# Patient Record
Sex: FEMALE | Race: WHITE | NOT HISPANIC OR LATINO | Employment: PART TIME | URBAN - METROPOLITAN AREA
[De-identification: names, ages, dates, MRNs, and addresses within clinical notes are randomized per-mention and may not be internally consistent; named-entity substitution may affect disease eponyms.]

---

## 2024-07-23 ENCOUNTER — APPOINTMENT (OUTPATIENT)
Dept: GENERAL RADIOLOGY | Facility: CLINIC | Age: 79
DRG: 562 | End: 2024-07-23
Payer: MEDICARE

## 2024-07-23 ENCOUNTER — APPOINTMENT (OUTPATIENT)
Dept: CT IMAGING | Facility: CLINIC | Age: 79
DRG: 562 | End: 2024-07-23
Payer: MEDICARE

## 2024-07-23 ENCOUNTER — HOSPITAL ENCOUNTER (INPATIENT)
Facility: CLINIC | Age: 79
LOS: 4 days | Discharge: NURSING, REHAB OR LONG-TERM CARE | DRG: 562 | End: 2024-07-30
Attending: EMERGENCY MEDICINE | Admitting: STUDENT IN AN ORGANIZED HEALTH CARE EDUCATION/TRAINING PROGRAM
Payer: MEDICARE

## 2024-07-23 DIAGNOSIS — S82.202A LEFT TIBIAL FRACTURE: ICD-10-CM

## 2024-07-23 DIAGNOSIS — S82.402A TIBIA/FIBULA FRACTURE, LEFT, CLOSED, INITIAL ENCOUNTER: ICD-10-CM

## 2024-07-23 DIAGNOSIS — F41.9 ANXIETY: ICD-10-CM

## 2024-07-23 DIAGNOSIS — H04.123 DRY EYES: ICD-10-CM

## 2024-07-23 DIAGNOSIS — H40.50X3 GLAUCOMA SECONDARY TO OTHER EYE DISORDER, SEVERE STAGE, UNSPECIFIED LATERALITY: ICD-10-CM

## 2024-07-23 DIAGNOSIS — I82.409 ACUTE DEEP VEIN THROMBOSIS (DVT) OF OTHER VEIN OF LOWER EXTREMITY, UNSPECIFIED LATERALITY (H): Primary | ICD-10-CM

## 2024-07-23 DIAGNOSIS — I10 PRIMARY HYPERTENSION: ICD-10-CM

## 2024-07-23 DIAGNOSIS — S82.202A TIBIA/FIBULA FRACTURE, LEFT, CLOSED, INITIAL ENCOUNTER: ICD-10-CM

## 2024-07-23 DIAGNOSIS — L43.9 LICHEN PLANUS: ICD-10-CM

## 2024-07-23 LAB
ATRIAL RATE - MUSE: 104 BPM
DIASTOLIC BLOOD PRESSURE - MUSE: NORMAL MMHG
INTERPRETATION ECG - MUSE: NORMAL
P AXIS - MUSE: 64 DEGREES
PR INTERVAL - MUSE: 188 MS
QRS DURATION - MUSE: 68 MS
QT - MUSE: 342 MS
QTC - MUSE: 449 MS
R AXIS - MUSE: 90 DEGREES
SYSTOLIC BLOOD PRESSURE - MUSE: NORMAL MMHG
T AXIS - MUSE: 65 DEGREES
VENTRICULAR RATE- MUSE: 104 BPM

## 2024-07-23 PROCEDURE — G0378 HOSPITAL OBSERVATION PER HR: HCPCS

## 2024-07-23 PROCEDURE — 73552 X-RAY EXAM OF FEMUR 2/>: CPT | Mod: LT

## 2024-07-23 PROCEDURE — 96374 THER/PROPH/DIAG INJ IV PUSH: CPT

## 2024-07-23 PROCEDURE — 250N000011 HC RX IP 250 OP 636: Mod: JZ

## 2024-07-23 PROCEDURE — 99285 EMERGENCY DEPT VISIT HI MDM: CPT | Mod: 25

## 2024-07-23 PROCEDURE — 73590 X-RAY EXAM OF LOWER LEG: CPT | Mod: LT

## 2024-07-23 PROCEDURE — 93005 ELECTROCARDIOGRAM TRACING: CPT

## 2024-07-23 PROCEDURE — 72192 CT PELVIS W/O DYE: CPT

## 2024-07-23 PROCEDURE — 72170 X-RAY EXAM OF PELVIS: CPT

## 2024-07-23 PROCEDURE — 99222 1ST HOSP IP/OBS MODERATE 55: CPT | Performed by: HOSPITALIST

## 2024-07-23 PROCEDURE — 73700 CT LOWER EXTREMITY W/O DYE: CPT | Mod: LT,XS

## 2024-07-23 PROCEDURE — 73700 CT LOWER EXTREMITY W/O DYE: CPT | Mod: LT

## 2024-07-23 PROCEDURE — 73560 X-RAY EXAM OF KNEE 1 OR 2: CPT | Mod: LT

## 2024-07-23 PROCEDURE — 96375 TX/PRO/DX INJ NEW DRUG ADDON: CPT

## 2024-07-23 RX ORDER — CLOBETASOL PROPIONATE 0.5 MG/G
CREAM TOPICAL
Status: ON HOLD | COMMUNITY
End: 2024-07-30

## 2024-07-23 RX ORDER — TAFLUPROST OPTHALMIC 0 MG/.3ML
1 SOLUTION/ DROPS OPHTHALMIC AT BEDTIME
COMMUNITY

## 2024-07-23 RX ORDER — BRIMONIDINE TARTRATE 2 MG/ML
2 SOLUTION/ DROPS OPHTHALMIC 2 TIMES DAILY
COMMUNITY

## 2024-07-23 RX ORDER — ONDANSETRON 2 MG/ML
4 INJECTION INTRAMUSCULAR; INTRAVENOUS ONCE
Status: COMPLETED | OUTPATIENT
Start: 2024-07-23 | End: 2024-07-23

## 2024-07-23 RX ORDER — SUMATRIPTAN 25 MG/1
25 TABLET, FILM COATED ORAL
COMMUNITY

## 2024-07-23 RX ORDER — ACETAMINOPHEN 500 MG
1000 TABLET ORAL EVERY 6 HOURS PRN
COMMUNITY

## 2024-07-23 RX ORDER — MORPHINE SULFATE 4 MG/ML
4 INJECTION, SOLUTION INTRAMUSCULAR; INTRAVENOUS ONCE
Status: COMPLETED | OUTPATIENT
Start: 2024-07-23 | End: 2024-07-23

## 2024-07-23 RX ORDER — AMLODIPINE BESYLATE 5 MG/1
5 TABLET ORAL DAILY
Status: ON HOLD | COMMUNITY
End: 2024-07-29

## 2024-07-23 RX ORDER — CYCLOSPORINE 0.5 MG/ML
1 EMULSION OPHTHALMIC 2 TIMES DAILY
COMMUNITY

## 2024-07-23 RX ORDER — FENTANYL CITRATE 50 UG/ML
25 INJECTION, SOLUTION INTRAMUSCULAR; INTRAVENOUS ONCE
Status: COMPLETED | OUTPATIENT
Start: 2024-07-23 | End: 2024-07-23

## 2024-07-23 RX ORDER — IBANDRONATE SODIUM 150 MG/1
150 TABLET, FILM COATED ORAL
COMMUNITY

## 2024-07-23 RX ORDER — MULTIPLE VITAMINS W/ MINERALS TAB 9MG-400MCG
1 TAB ORAL DAILY
COMMUNITY

## 2024-07-23 RX ADMIN — ONDANSETRON 4 MG: 2 INJECTION INTRAMUSCULAR; INTRAVENOUS at 17:58

## 2024-07-23 RX ADMIN — PROCHLORPERAZINE EDISYLATE 5 MG: 5 INJECTION INTRAMUSCULAR; INTRAVENOUS at 19:42

## 2024-07-23 RX ADMIN — FENTANYL CITRATE 25 MCG: 50 INJECTION, SOLUTION INTRAMUSCULAR; INTRAVENOUS at 18:01

## 2024-07-23 RX ADMIN — MORPHINE SULFATE 4 MG: 4 INJECTION, SOLUTION INTRAMUSCULAR; INTRAVENOUS at 20:03

## 2024-07-23 ASSESSMENT — ACTIVITIES OF DAILY LIVING (ADL)
ADLS_ACUITY_SCORE: 35

## 2024-07-23 ASSESSMENT — COLUMBIA-SUICIDE SEVERITY RATING SCALE - C-SSRS
1. IN THE PAST MONTH, HAVE YOU WISHED YOU WERE DEAD OR WISHED YOU COULD GO TO SLEEP AND NOT WAKE UP?: NO
6. HAVE YOU EVER DONE ANYTHING, STARTED TO DO ANYTHING, OR PREPARED TO DO ANYTHING TO END YOUR LIFE?: NO
2. HAVE YOU ACTUALLY HAD ANY THOUGHTS OF KILLING YOURSELF IN THE PAST MONTH?: NO

## 2024-07-23 NOTE — ED TRIAGE NOTES
Patient presents to the ED via EMS with reports of visiting a friend and reports  falling backwards on patient. Patient reports left knee pain and left hip pain. Patient had IV placed by EMS and was given Fentanyl, Droperidol and Zofran. O2 sats dropped. Patient on 2L O2. Patients .     Triage Assessment (Adult)       Row Name 07/23/24 5944          Triage Assessment    Airway WDL WDL        Respiratory WDL    Respiratory WDL WDL        Skin Circulation/Temperature WDL    Skin Circulation/Temperature WDL WDL        Cardiac WDL    Cardiac WDL WDL        Peripheral/Neurovascular WDL    Peripheral Neurovascular WDL WDL        Cognitive/Neuro/Behavioral WDL    Cognitive/Neuro/Behavioral WDL WDL

## 2024-07-23 NOTE — ED PROVIDER NOTES
ED APC SUPERVISION NOTE:   I evaluated this patient in conjunction with Kelvin Corral NP  I have participated in the care of the patient and personally performed key elements of the history, exam, and medical decision making.      HPI:   Lacie Choi is a 78 year old female presenting with left hip and left knee pain after a fall.  She was walking behind her , when he fell backwards.  She tried to catch him and unfortunately her  fell on top of her.  She landed on her left lower extremity.  She endorses left hip and left knee pain.  She was unable to ambulate after the fall.  She did not strike her head or lose consciousness.  No neck pain.  No numbness or tingling in the lower extremity.  EMS administered fentanyl, droperidol and Zofran.          Independent Historian:   None    Review of External Notes: 7/15/24: Clinic note reviewed      EXAM:   General: No acute distress  Head: No obvious trauma to head.  Ears, Nose, Throat:  External ears normal.  Nose normal.  No pharyngeal erythema, swelling or exudate.  Midline uvula. Moist mucus membranes.  Eyes:  Conjunctivae clear.   Neck: Normal range of motion.  Neck supple.   CV: Regular rate and rhythm.  No murmurs.      Respiratory: Effort normal and breath sounds normal.  No wheezing or crackles.   Gastrointestinal: Soft.  No distension. There is no tenderness.  There is no rigidity, no rebound and no guarding.   Musculoskeletal: Left hip pain and left knee pain to palpation. No gross deformities.   Neuro: Alert. Moving all extremities appropriately.  Normal speech.    Skin: Skin is warm and dry.  No rash noted.   Psych: Normal mood and affect. Behavior is normal.       Independent Interpretation (X-rays, CTs, rhythm strip):  Knee x-ray shows cortical irregularity is at the tibial plateau    Consultations/Discussion of Management or Tests:  Patient discussed with Dr. Peña, hospitalist    Social Determinants of Health affecting care:    None     MEDICAL DECISION MAKING/ASSESSMENT AND PLAN:   Patient presents with left lower extremity pain after a fall.  She is neurovascularly intact.  She did not strike her head.  X-ray is concerning for tibial plateau fracture, but it is difficult to determine.  CT scan is obtained and confirms a nondisplaced tibial plateau fracture.  She requires fentanyl and morphine for pain control.  I discussed the CT findings with the patient and her .  They report that they live in a 4 story home.  I am concerned that she would be a fall risk if she were to be discharged tonight.  I think she would benefit from staying in the hospital for continued pain control and to have a formal PT/OT evaluation and orthopedic evaluation in the morning.  She is agreeable with this.  The patient is discussed with the hospitalist who agreed to admit the patient their service.  She remains in stable condition.       DIAGNOSIS:     ICD-10-CM    1. Left tibial fracture  S82.202A              DISPOSITION:   Patient admitted to the hospital     Scribe Disclosure:  I, Renae Gerard, am serving as a scribe at 5:43 PM on 7/23/2024 to document services personally performed by Luis Fernando Jeffrey MD based on my observations and the provider's statements to me.     7/23/2024  [facility] EMERGENCY DEPT     Luis Fernando Jeffrey MD  07/23/24 5944

## 2024-07-23 NOTE — ED NOTES
Bed: ED15  Expected date:   Expected time:   Means of arrival:   Comments:  [facility] 78 F fall knee pain

## 2024-07-23 NOTE — ED PROVIDER NOTES
Emergency Department Note      History of Present Illness     Chief Complaint   Fall (Patient presents to the ED via EMS with reports of visiting a friend and reports  falling backwards on patient. Patient reports left knee pain and left hip pain. Patient had IV placed by EMS and was given Fentanyl, Droperidol and Zofran. O2 sats dropped. Patient on 2L O2. Patients .)      ARON Choi is a 78 year old female who presents emergency department via EMS for the evaluation of a fall.  Patient reports that she was visiting a friend and was walking behind her .  She reports her  started to fall backwards and as she reached out to catch him subsequently fell herself.  She reports that she thinks that she landed on her left side primarily on her left knee.  Patient reports that she is primarily experiencing pain in her left knee, left hip, and left lower leg.  She denies any loss of consciousness.  She denies any cervical neck tenderness.     Independent Historian   None    Review of External Notes   None    Past Medical History     Medical History and Problem List   Past Medical History:   Diagnosis Date    Ankle instability, left     Anxiety     Cancer, uterine (H)     Diverticulosis of large intestine     Elevated coronary artery calcium score 2019    Glaucoma     HTN (hypertension)     Hyperlipidemia     Lichen sclerosus     Migraine     Nephrolithiasis 2011    Osteochondral defect of ankle     Osteoporosis     Raynaud's syndrome     Reactive gastropathy 2021    Squamous cell carcinoma, leg, right 2022    Tarsal coalition of left foot        Medications   No current outpatient medications on file.      Surgical History   Past Surgical History:   Procedure Laterality Date    APPENDECTOMY      ARTHROSCOPY ANKLE Left 08/30/2022    Left ankle arthroscopy, treatment of osteochondral injury talus, lateral ligament reconstruction, treatment of calcaneonavicular coaltion    DILATION AND  "CURETTAGE      HYSTERECTOMY TOTAL ABDOMINAL, BILATERAL SALPINGO-OOPHORECTOMY, COMBINED  1988    MOHS MICROGRAPHIC PROCEDURE  2005    TONSILLECTOMY      TUBAL LIGATION         Physical Exam     Patient Vitals for the past 24 hrs:   BP Temp Temp src Pulse Resp SpO2 Height Weight   07/23/24 2200 118/66 -- -- 106 -- 92 % -- --   07/23/24 2145 (!) 111/93 -- -- 105 -- 92 % -- --   07/23/24 2130 106/58 -- -- 97 -- 92 % -- --   07/23/24 2115 110/59 -- -- 101 -- 91 % -- --   07/23/24 2100 114/68 -- -- 106 -- 91 % -- --   07/23/24 2030 121/64 -- -- 105 -- 93 % -- --   07/23/24 2015 98/56 -- -- 104 -- 92 % -- --   07/23/24 2000 100/55 -- -- 107 -- 93 % -- --   07/23/24 1945 102/76 -- -- 117 -- 90 % -- --   07/23/24 1930 119/63 -- -- 109 -- 93 % -- --   07/23/24 1915 136/68 -- -- 113 -- 92 % -- --   07/23/24 1900 125/73 -- -- -- -- 91 % -- --   07/23/24 1724 121/71 97.5  F (36.4  C) Tympanic 95 19 94 % 1.499 m (4' 11\") 52.2 kg (115 lb)     Physical Exam  General: Alert and oriented.    Head: Normocephalic.  External ears and nose normal.    Eyes: Pupils equal and round.  Normal tracking.    Pulmonary/Chest: Effort and rate normal.     MSK:    No focal ttp over lateral process of tibial plateau.  Normal tracking of the patella with quadriceps and patella tendons intact on knee extension with straight leg raise.  No gross ligamentous laxity.  Normal movement at the hip and ankle.  Compartments soft, no pain w/passive ROM of ankle or great toe.    SKIN:  Warm and dry with strong DP,PT pulse and normal capillary refill. No rashes or crepitance.    NEURO:  Normal sensation throughout the foot and ankle. Normal strength of plantar and dorsiflexion in ankle and toes.    PSYCH:  Normal affect     Diagnostics     Lab Results   Labs Ordered and Resulted from Time of ED Arrival to Time of ED Departure - No data to display    Imaging   CT Pelvis Bone wo Contrast   Final Result   IMPRESSION:   1.  Acute-appearing, nondisplaced fracture of " the proximal, medial tibial metaphysis. This demonstrates equivocal intra-articular extension to the medial tibial plateau, although the lack of a significant joint effusion suggests against intra-articular    extension.   2.  No acute left femur or pelvic fracture is identified. Given the degree of osseous demineralization, MRI would be more sensitive for nondisplaced fracture and should be considered if there is persistent clinical concern.   3.  Additional chronic-appearing findings, as described.      CT Femur Thigh Left w/o Contrast   Final Result   IMPRESSION:   1.  Acute-appearing, nondisplaced fracture of the proximal, medial tibial metaphysis. This demonstrates equivocal intra-articular extension to the medial tibial plateau, although the lack of a significant joint effusion suggests against intra-articular    extension.   2.  No acute left femur or pelvic fracture is identified. Given the degree of osseous demineralization, MRI would be more sensitive for nondisplaced fracture and should be considered if there is persistent clinical concern.   3.  Additional chronic-appearing findings, as described.      CT Knee Left w/o Contrast   Final Result   IMPRESSION:   1.  Acute-appearing, nondisplaced fracture of the proximal, medial tibial metaphysis. This demonstrates equivocal intra-articular extension to the medial tibial plateau, although the lack of a significant joint effusion suggests against intra-articular    extension.   2.  No acute left femur or pelvic fracture is identified. Given the degree of osseous demineralization, MRI would be more sensitive for nondisplaced fracture and should be considered if there is persistent clinical concern.   3.  Additional chronic-appearing findings, as described.      XR Pelvis 1/2 Views   Final Result   IMPRESSION:       There is diffuse, severe osseous demineralization which limits radiographic evaluation for nondisplaced fractures. Additionally, the views of the  left hip and proximal femur are nonstandard and limited by patient rotation as there was difficulty    positioning the patient due to patient pain.      1.  There is cortical irregularity to the anterior medial tibial plateau which likely represents a nondisplaced, acute fracture. Knee CT could confirm.   2.  There is some sclerosis involving the left femoral neck and a nondisplaced femoral neck fracture is difficult to exclude. Additionally, the pelvis is poorly evaluated due to demineralization and patient rotation. Recommend CT of the left hip/pelvis    for full evaluation.      XR Femur Left 2 Views   Final Result   IMPRESSION:       There is diffuse, severe osseous demineralization which limits radiographic evaluation for nondisplaced fractures. Additionally, the views of the left hip and proximal femur are nonstandard and limited by patient rotation as there was difficulty    positioning the patient due to patient pain.      1.  There is cortical irregularity to the anterior medial tibial plateau which likely represents a nondisplaced, acute fracture. Knee CT could confirm.   2.  There is some sclerosis involving the left femoral neck and a nondisplaced femoral neck fracture is difficult to exclude. Additionally, the pelvis is poorly evaluated due to demineralization and patient rotation. Recommend CT of the left hip/pelvis    for full evaluation.      XR Knee Left 1/2 Views   Final Result   IMPRESSION:       There is diffuse, severe osseous demineralization which limits radiographic evaluation for nondisplaced fractures. Additionally, the views of the left hip and proximal femur are nonstandard and limited by patient rotation as there was difficulty    positioning the patient due to patient pain.      1.  There is cortical irregularity to the anterior medial tibial plateau which likely represents a nondisplaced, acute fracture. Knee CT could confirm.   2.  There is some sclerosis involving the left femoral  neck and a nondisplaced femoral neck fracture is difficult to exclude. Additionally, the pelvis is poorly evaluated due to demineralization and patient rotation. Recommend CT of the left hip/pelvis    for full evaluation.      XR Tibia and Fibula Left 2 Views   Final Result   IMPRESSION:       There is diffuse, severe osseous demineralization which limits radiographic evaluation for nondisplaced fractures. Additionally, the views of the left hip and proximal femur are nonstandard and limited by patient rotation as there was difficulty    positioning the patient due to patient pain.      1.  There is cortical irregularity to the anterior medial tibial plateau which likely represents a nondisplaced, acute fracture. Knee CT could confirm.   2.  There is some sclerosis involving the left femoral neck and a nondisplaced femoral neck fracture is difficult to exclude. Additionally, the pelvis is poorly evaluated due to demineralization and patient rotation. Recommend CT of the left hip/pelvis    for full evaluation.          EKG           Independent Interpretation   None    ED Course      Medications Administered   Medications   fentaNYL (PF) (SUBLIMAZE) injection 25 mcg (25 mcg Intravenous $Given 7/23/24 1801)   ondansetron (ZOFRAN) injection 4 mg (4 mg Intravenous $Given 7/23/24 1758)   morphine (PF) injection 4 mg (4 mg Intravenous $Given 7/23/24 2003)   prochlorperazine (COMPAZINE) injection 5 mg (5 mg Intravenous $Given 7/23/24 1942)       Procedures   Procedures     Discussion of Management   Admitting HospitalistMariana    ED Course        Optional/Additional Documentation  None    Medical Decision Making / Diagnosis     CMS Diagnoses: None    MIPS       None    MDM   Lacie Choi is a 78 year old female who presents to the emergency department via EMS for the evaluation of left knee pain.  Patient reports that she fell backwards while trying to catch her  who is also following.  She reports that  she landed on her left knee.  There is pain and tenderness noted during palpation of her patella extending distally down her left tibia.  X-rays were initially performed of her left knee and left hip however the patient was unable to lay flat.  CT scan was then performed which then did confirm a left tibial fracture.  Patient CMS to the left leg is intact.  Patient is having difficulty with mobility and is having poor pain control here in the emergency department.  I discussed these findings with the admitting hospitalist who agreed to admit the patient observation for further pain control and orthopedic consult.  Patient was agreeable to this plan.  All questions and concerns were addressed and patient verbalized understanding.  All radiological findings were discussed with the patient and patient verbalized understanding.  Patient was successfully admitted to the hospital for further evaluation and observation.    Disposition   The patient was admitted to the hospital.     Diagnosis     ICD-10-CM    1. Left tibial fracture  S82.202A            Discharge Medications   New Prescriptions    No medications on file         LIZ Jones CNP, Casey, APRN CNP  07/23/24 2228

## 2024-07-24 ENCOUNTER — DOCUMENTATION ONLY (OUTPATIENT)
Dept: ORTHOPEDICS | Facility: CLINIC | Age: 79
End: 2024-07-24
Payer: COMMERCIAL

## 2024-07-24 ENCOUNTER — APPOINTMENT (OUTPATIENT)
Dept: PHYSICAL THERAPY | Facility: CLINIC | Age: 79
DRG: 562 | End: 2024-07-24
Attending: PHYSICIAN ASSISTANT
Payer: MEDICARE

## 2024-07-24 LAB
ANION GAP SERPL CALCULATED.3IONS-SCNC: 9 MMOL/L (ref 7–15)
BASOPHILS # BLD AUTO: 0 10E3/UL (ref 0–0.2)
BASOPHILS NFR BLD AUTO: 0 %
BUN SERPL-MCNC: 17.8 MG/DL (ref 8–23)
CALCIUM SERPL-MCNC: 9.2 MG/DL (ref 8.8–10.4)
CHLORIDE SERPL-SCNC: 108 MMOL/L (ref 98–107)
CREAT SERPL-MCNC: 0.78 MG/DL (ref 0.51–0.95)
EGFRCR SERPLBLD CKD-EPI 2021: 78 ML/MIN/1.73M2
EOSINOPHIL # BLD AUTO: 0 10E3/UL (ref 0–0.7)
EOSINOPHIL NFR BLD AUTO: 0 %
ERYTHROCYTE [DISTWIDTH] IN BLOOD BY AUTOMATED COUNT: 13.7 % (ref 10–15)
GLUCOSE BLDC GLUCOMTR-MCNC: 153 MG/DL (ref 70–99)
GLUCOSE SERPL-MCNC: 130 MG/DL (ref 70–99)
HCO3 SERPL-SCNC: 26 MMOL/L (ref 22–29)
HCT VFR BLD AUTO: 45.9 % (ref 35–47)
HGB BLD-MCNC: 15 G/DL (ref 11.7–15.7)
IMM GRANULOCYTES # BLD: 0.1 10E3/UL
IMM GRANULOCYTES NFR BLD: 0 %
LYMPHOCYTES # BLD AUTO: 1.2 10E3/UL (ref 0.8–5.3)
LYMPHOCYTES NFR BLD AUTO: 10 %
MCH RBC QN AUTO: 31.8 PG (ref 26.5–33)
MCHC RBC AUTO-ENTMCNC: 32.7 G/DL (ref 31.5–36.5)
MCV RBC AUTO: 98 FL (ref 78–100)
MONOCYTES # BLD AUTO: 0.8 10E3/UL (ref 0–1.3)
MONOCYTES NFR BLD AUTO: 7 %
NEUTROPHILS # BLD AUTO: 10 10E3/UL (ref 1.6–8.3)
NEUTROPHILS NFR BLD AUTO: 83 %
NRBC # BLD AUTO: 0 10E3/UL
NRBC BLD AUTO-RTO: 0 /100
PLATELET # BLD AUTO: 156 10E3/UL (ref 150–450)
POTASSIUM SERPL-SCNC: 3.8 MMOL/L (ref 3.4–5.3)
RBC # BLD AUTO: 4.71 10E6/UL (ref 3.8–5.2)
SODIUM SERPL-SCNC: 143 MMOL/L (ref 135–145)
WBC # BLD AUTO: 12.1 10E3/UL (ref 4–11)

## 2024-07-24 PROCEDURE — 80048 BASIC METABOLIC PNL TOTAL CA: CPT | Performed by: HOSPITALIST

## 2024-07-24 PROCEDURE — L1832 KO ADJ JNT POS R SUP PRE CST: HCPCS

## 2024-07-24 PROCEDURE — G0378 HOSPITAL OBSERVATION PER HR: HCPCS

## 2024-07-24 PROCEDURE — 97530 THERAPEUTIC ACTIVITIES: CPT | Mod: GP

## 2024-07-24 PROCEDURE — 97161 PT EVAL LOW COMPLEX 20 MIN: CPT | Mod: GP

## 2024-07-24 PROCEDURE — 82962 GLUCOSE BLOOD TEST: CPT

## 2024-07-24 PROCEDURE — 250N000009 HC RX 250: Performed by: HOSPITALIST

## 2024-07-24 PROCEDURE — 99233 SBSQ HOSP IP/OBS HIGH 50: CPT | Performed by: PHYSICIAN ASSISTANT

## 2024-07-24 PROCEDURE — 250N000013 HC RX MED GY IP 250 OP 250 PS 637: Performed by: STUDENT IN AN ORGANIZED HEALTH CARE EDUCATION/TRAINING PROGRAM

## 2024-07-24 PROCEDURE — 36415 COLL VENOUS BLD VENIPUNCTURE: CPT | Performed by: HOSPITALIST

## 2024-07-24 PROCEDURE — 85025 COMPLETE CBC W/AUTO DIFF WBC: CPT | Performed by: HOSPITALIST

## 2024-07-24 PROCEDURE — 250N000013 HC RX MED GY IP 250 OP 250 PS 637: Performed by: HOSPITALIST

## 2024-07-24 RX ORDER — AMOXICILLIN 250 MG
2 CAPSULE ORAL 2 TIMES DAILY PRN
Status: DISCONTINUED | OUTPATIENT
Start: 2024-07-24 | End: 2024-07-30 | Stop reason: HOSPADM

## 2024-07-24 RX ORDER — NALOXONE HYDROCHLORIDE 0.4 MG/ML
0.4 INJECTION, SOLUTION INTRAMUSCULAR; INTRAVENOUS; SUBCUTANEOUS
Status: DISCONTINUED | OUTPATIENT
Start: 2024-07-24 | End: 2024-07-30

## 2024-07-24 RX ORDER — ACETAMINOPHEN 325 MG/1
975 TABLET ORAL 3 TIMES DAILY
Status: DISCONTINUED | OUTPATIENT
Start: 2024-07-24 | End: 2024-07-25

## 2024-07-24 RX ORDER — OXYCODONE HYDROCHLORIDE 5 MG/1
5 TABLET ORAL EVERY 4 HOURS PRN
Status: DISCONTINUED | OUTPATIENT
Start: 2024-07-24 | End: 2024-07-29

## 2024-07-24 RX ORDER — BRIMONIDINE TARTRATE 2 MG/ML
2 SOLUTION/ DROPS OPHTHALMIC 2 TIMES DAILY
Status: DISCONTINUED | OUTPATIENT
Start: 2024-07-24 | End: 2024-07-30 | Stop reason: HOSPADM

## 2024-07-24 RX ORDER — ONDANSETRON 4 MG/1
4 TABLET, ORALLY DISINTEGRATING ORAL EVERY 6 HOURS PRN
Status: DISCONTINUED | OUTPATIENT
Start: 2024-07-24 | End: 2024-07-30 | Stop reason: HOSPADM

## 2024-07-24 RX ORDER — CARBOXYMETHYLCELLULOSE SODIUM 5 MG/ML
1 SOLUTION/ DROPS OPHTHALMIC 2 TIMES DAILY
Status: DISCONTINUED | OUTPATIENT
Start: 2024-07-24 | End: 2024-07-30 | Stop reason: HOSPADM

## 2024-07-24 RX ORDER — ONDANSETRON 2 MG/ML
4 INJECTION INTRAMUSCULAR; INTRAVENOUS EVERY 6 HOURS PRN
Status: DISCONTINUED | OUTPATIENT
Start: 2024-07-24 | End: 2024-07-30 | Stop reason: HOSPADM

## 2024-07-24 RX ORDER — OXYCODONE HYDROCHLORIDE 5 MG/1
10 TABLET ORAL EVERY 4 HOURS PRN
Status: DISCONTINUED | OUTPATIENT
Start: 2024-07-24 | End: 2024-07-25

## 2024-07-24 RX ORDER — AMOXICILLIN 250 MG
1 CAPSULE ORAL 2 TIMES DAILY PRN
Status: DISCONTINUED | OUTPATIENT
Start: 2024-07-24 | End: 2024-07-30 | Stop reason: HOSPADM

## 2024-07-24 RX ORDER — TAFLUPROST OPTHALMIC 0 MG/.3ML
1 SOLUTION/ DROPS OPHTHALMIC AT BEDTIME
Status: DISCONTINUED | OUTPATIENT
Start: 2024-07-24 | End: 2024-07-24

## 2024-07-24 RX ORDER — HYDROMORPHONE HCL IN WATER/PF 6 MG/30 ML
0.4 PATIENT CONTROLLED ANALGESIA SYRINGE INTRAVENOUS
Status: DISCONTINUED | OUTPATIENT
Start: 2024-07-24 | End: 2024-07-29

## 2024-07-24 RX ORDER — LORAZEPAM 2 MG/ML
0.5 INJECTION INTRAMUSCULAR EVERY 4 HOURS PRN
Status: DISCONTINUED | OUTPATIENT
Start: 2024-07-24 | End: 2024-07-25

## 2024-07-24 RX ORDER — LANOLIN ALCOHOL/MO/W.PET/CERES
3 CREAM (GRAM) TOPICAL
Status: DISCONTINUED | OUTPATIENT
Start: 2024-07-24 | End: 2024-07-30 | Stop reason: HOSPADM

## 2024-07-24 RX ORDER — CYCLOSPORINE 0.5 MG/ML
1 EMULSION OPHTHALMIC 2 TIMES DAILY
Status: DISCONTINUED | OUTPATIENT
Start: 2024-07-24 | End: 2024-07-24

## 2024-07-24 RX ORDER — HYDROMORPHONE HCL IN WATER/PF 6 MG/30 ML
0.2 PATIENT CONTROLLED ANALGESIA SYRINGE INTRAVENOUS
Status: DISCONTINUED | OUTPATIENT
Start: 2024-07-24 | End: 2024-07-29

## 2024-07-24 RX ORDER — LORAZEPAM 1 MG/1
1 TABLET ORAL ONCE
Status: COMPLETED | OUTPATIENT
Start: 2024-07-24 | End: 2024-07-24

## 2024-07-24 RX ORDER — LATANOPROST 50 UG/ML
1 SOLUTION/ DROPS OPHTHALMIC AT BEDTIME
Status: DISCONTINUED | OUTPATIENT
Start: 2024-07-24 | End: 2024-07-30 | Stop reason: HOSPADM

## 2024-07-24 RX ORDER — NALOXONE HYDROCHLORIDE 0.4 MG/ML
0.2 INJECTION, SOLUTION INTRAMUSCULAR; INTRAVENOUS; SUBCUTANEOUS
Status: DISCONTINUED | OUTPATIENT
Start: 2024-07-24 | End: 2024-07-30

## 2024-07-24 RX ORDER — AMLODIPINE BESYLATE 5 MG/1
5 TABLET ORAL DAILY
Status: DISCONTINUED | OUTPATIENT
Start: 2024-07-24 | End: 2024-07-27

## 2024-07-24 RX ADMIN — ACETAMINOPHEN 975 MG: 325 TABLET, FILM COATED ORAL at 14:51

## 2024-07-24 RX ADMIN — OXYCODONE HYDROCHLORIDE 5 MG: 5 TABLET ORAL at 09:48

## 2024-07-24 RX ADMIN — BRIMONIDINE TARTRATE 2 DROP: 2 SOLUTION/ DROPS OPHTHALMIC at 21:55

## 2024-07-24 RX ADMIN — LATANOPROST 1 DROP: 50 SOLUTION OPHTHALMIC at 21:56

## 2024-07-24 RX ADMIN — ACETAMINOPHEN 975 MG: 325 TABLET, FILM COATED ORAL at 05:26

## 2024-07-24 RX ADMIN — CARBOXYMETHYLCELLULOSE SODIUM 1 DROP: 5 SOLUTION/ DROPS OPHTHALMIC at 21:55

## 2024-07-24 RX ADMIN — ACETAMINOPHEN 975 MG: 325 TABLET, FILM COATED ORAL at 19:46

## 2024-07-24 ASSESSMENT — ACTIVITIES OF DAILY LIVING (ADL)
ADLS_ACUITY_SCORE: 45
ADLS_ACUITY_SCORE: 49
ADLS_ACUITY_SCORE: 45
ADLS_ACUITY_SCORE: 45
ADLS_ACUITY_SCORE: 49
ADLS_ACUITY_SCORE: 49
ADLS_ACUITY_SCORE: 45
ADLS_ACUITY_SCORE: 49
ADLS_ACUITY_SCORE: 45
ADLS_ACUITY_SCORE: 45
ADLS_ACUITY_SCORE: 49
ADLS_ACUITY_SCORE: 35
ADLS_ACUITY_SCORE: 49
DEPENDENT_IADLS:: INDEPENDENT
ADLS_ACUITY_SCORE: 41
ADLS_ACUITY_SCORE: 49
ADLS_ACUITY_SCORE: 45
ADLS_ACUITY_SCORE: 35

## 2024-07-24 NOTE — PROGRESS NOTES
07/24/24 1600   Appointment Info   Signing Clinician's Name / Credentials (PT) Chelsea Gracia DPT   Rehab Comments (PT) NWB NAVJOTE, HK brace OSASHA   Quick Adds   Quick Adds Certification   Living Environment   People in Home spouse   Current Living Arrangements house   Home Accessibility stairs to enter home;stairs within home   Number of Stairs, Main Entrance 3   Stair Railings, Main Entrance railing on right side (ascending)   Number of Stairs, Within Home, Primary greater than 10 stairs  (4 level home, bathrooms are on second floor or down into basement)   Stair Railings, Within Home, Primary railings on both sides of stairs   Transportation Anticipated car, drives self;family or friend will provide   Living Environment Comments Lives with  in 4 level home with 3 JUAN, bathrooms are on second or basement level so pt cannot stay on one level. Has both walk in shower and tub.  can supervise but cannot help physically d/t chronic back issues.   Self-Care   Usual Activity Tolerance excellent   Current Activity Tolerance good   Regular Exercise No   Equipment Currently Used at Home walker, rolling;grab bar, toilet;grab bar, tub/shower;raised toilet seat;shower chair   Fall history within last six months yes   Number of times patient has fallen within last six months 1   Activity/Exercise/Self-Care Comment IND with ADLS/IADLS no AD prior to fall.   General Information   Onset of Illness/Injury or Date of Surgery 07/23/24   Referring Physician Radha Romero PA-C   Patient/Family Therapy Goals Statement (PT) Go home, open to TCU if in [place]   Pertinent History of Current Problem (include personal factors and/or comorbidities that impact the POC) Lacie Choi is a markedly pleasant 78 year old woman with past medical history that is most notable for osteoporosis, as well as hypertension, hyperlipidemia, and elevated coronary artery calcium score, among others; who presents with mechanical  fall and is found to have acute proximal tibial metaphysis fracture.   Existing Precautions/Restrictions fall;brace worn when out of bed;weight bearing   Weight-Bearing Status - LLE nonweight-bearing   Cognition   Affect/Mental Status (Cognition) WFL   Orientation Status (Cognition) oriented x 4   Follows Commands (Cognition) WFL   Pain Assessment   Patient Currently in Pain Yes, see Vital Sign flowsheet   Integumentary/Edema   Integumentary/Edema Comments Hinged knee brace present   Posture    Posture Forward head position   Range of Motion (ROM)   Range of Motion ROM deficits secondary to pain   Strength (Manual Muscle Testing)   Strength (Manual Muscle Testing) Deficits observed during functional mobility   Bed Mobility   Comment, (Bed Mobility) MinA2   Transfers   Comment, (Transfers) modA2 FWW NWB LLE   Gait/Stairs (Locomotion)   Comment, (Gait/Stairs) NT   Balance   Balance Comments Olvin seated balance   Clinical Impression   Criteria for Skilled Therapeutic Intervention Yes, treatment indicated   PT Diagnosis (PT) impaired gait   Influenced by the following impairments pain, NWB   Functional limitations due to impairments fall risk   Clinical Presentation (PT Evaluation Complexity) stable   Clinical Presentation Rationale clinical judgement   Clinical Decision Making (Complexity) low complexity   Planned Therapy Interventions (PT) balance training;bed mobility training;gait training;home exercise program;ROM (range of motion);stair training;strengthening;stretching;transfer training   Risk & Benefits of therapy have been explained evaluation/treatment results reviewed;care plan/treatment goals reviewed;risks/benefits reviewed;current/potential barriers reviewed;participants included;participants voiced agreement with care plan;patient   PT Total Evaluation Time   PT Eval, Low Complexity Minutes (26973) 14   Therapy Certification   Start of care date 07/23/24   Certification date from 07/24/24   Certification  date to 07/31/24   Physical Therapy Goals   PT Frequency 5x/week   PT Predicted Duration/Target Date for Goal Attainment 07/31/24   PT Goals Bed Mobility;Transfers;Gait;Stairs   PT: Bed Mobility Independent;Supine to/from sit;Rolling;Bridging   PT: Transfers Independent;Sit to/from stand;Bed to/from chair;Assistive device   PT: Gait Assistive device;100 feet;Within precautions   PT: Stairs Independent;Greater than 10 stairs;Rail on both sides   Interventions   Interventions Quick Adds Therapeutic Activity   Therapeutic Activity   Therapeutic Activities: dynamic activities to improve functional performance Minutes (18470) 26   Symptoms Noted During/After Treatment Dizziness  (Nausea)   Treatment Detail/Skilled Intervention Pt bedside, agreeable for PT. Reviewed NWB LLE recs, pt verbalized understanding. Time managing equipment in room and setting up. Mina2 bed mobility with cues for foot and hand palcement, assist managing LLE with hinged knee brace on. Olvin seated balance, BP WFL, pt reporting dizziness and nausea. Time at EOB waiting for sx to calm. Attempted sit<>Stnad modA2 with assist managing NWB LLE, standing for 10 sec before pt became too dizzy/nauseaous. TotalA to repo in bed. Alarm on. Edu on AP tfor circulation.   PT Discharge Planning   PT Plan Stand pivot as able, make sure recliner is in room. Consider SS if pt unable to hop.   PT Discharge Recommendation (DC Rec) Transitional Care Facility   PT Rationale for DC Rec Pt far below baseline IND no AD, currently unable to tolerate standing w/ modA2 FWW d/t dizziness and nausea. Pt lives in 4 level home with bathrooms on 2nd level,  is unable to provide any physical assist. Rec TCU to progress mobility as pt is unsafe to return home. Pt open to TCU if it can be in [place] where she lives.   PT Brief overview of current status RN to use lift to chair, consider SS if pt able to follow NWB LLE   PT Equipment Needed at Discharge walker, rolling   M  [facility]  OUTPATIENT PHYSICAL THERAPY EVALUATION  PLAN OF TREATMENT FOR OUTPATIENT REHABILITATION  (COMPLETE FOR INITIAL CLAIMS ONLY)  Patient's Last Name, First Name, M.I.  YOB: 1945  Lacie Choi                        Provider's Name  [facility] Medical Record No.  [id number]                             Onset Date:  07/23/24   Start of Care Date:  (P) 07/23/24   Type:     _X_PT   ___OT   ___SLP Medical Diagnosis:                 PT Diagnosis:  (P) impaired gait Visits from SOC:  1     See note for plan of treatment, functional goals and certification details    I CERTIFY THE NEED FOR THESE SERVICES FURNISHED UNDER        THIS PLAN OF TREATMENT AND WHILE UNDER MY CARE     (Physician co-signature of this document indicates review and certification of the therapy plan).

## 2024-07-24 NOTE — CONSULTS
[facility]    Orthopedic Consultation    Lacie Choi MRN# [id number]   Age: 78 year old YOB: 1945     Date of Admission:  7/23/2024    Reason for consult: Left proximal medial tibia non-displaced fracture        Requesting provider: KYLE Peña       Level of consult: One-time consult to assist in determining a diagnosis, recommend an appropriate treatment plan and place orders           Assessment and Plan:   Assessment:   Left proximal medial tibia non-displaced fracture       Plan:   Images were reviewed by Dr Lyle  Non-operative management recommended  Non-WB Left LE with hinged knee brace locked in extension.   Brace can be unlocked for ROMAT when at rest  Elevate left LE and ice prn   Follow-up with Dr Lyle at [facility] in 2 weeks for recheck.            Chief Complaint:   Left knee pain          History of Present Illness:   Lacie Choi is a pleasant 78 year old woman who presented to the ED with sudden onset of pain in her left leg since falling yesterday. She was at a friend's house with her .  She reports she was following her  on stairs when he slipped and fell onto her, causing her to fall onto her leg. She had immediate pain and difficulty ambulating.  EMS was called. CT revealed a non-displaced fracture.  She ambulates independently at baseline. CT as pelvis was negative for fracture.           Past Medical History:     Past Medical History:   Diagnosis Date    Ankle instability, left     Prior stress fracture    Anxiety     Cancer, uterine (H)     Status post DICK BSO remotely    Diverticulosis of large intestine     Elevated coronary artery calcium score 2019    Glaucoma     HTN (hypertension)     Hyperlipidemia     Lichen sclerosus     Migraine     Nephrolithiasis 2011    Osteochondral defect of ankle     Osteoporosis     Raynaud's syndrome     Reactive gastropathy 2021    On EGD    Squamous cell carcinoma, leg, right 2022    Tarsal  coalition of left foot              Past Surgical History:     Past Surgical History:   Procedure Laterality Date    APPENDECTOMY      ARTHROSCOPY ANKLE Left 08/30/2022    Left ankle arthroscopy, treatment of osteochondral injury talus, lateral ligament reconstruction, treatment of calcaneonavicular coaltion    DILATION AND CURETTAGE      HYSTERECTOMY TOTAL ABDOMINAL, BILATERAL SALPINGO-OOPHORECTOMY, COMBINED  1988    MOHS MICROGRAPHIC PROCEDURE  2005    TONSILLECTOMY      TUBAL LIGATION               Social History:     Social History     Tobacco Use    Smoking status: Former     Types: Cigarettes    Smokeless tobacco: Not on file   Substance Use Topics    Alcohol use: Yes             Family History:   No family history on file.          Immunizations:     VACCINE/DOSE   Diptheria   DPT   DTAP   HBIG   Hepatitis A   Hepatitis B   HIB   Influenza   Measles   Meningococcal   MMR   Mumps   Pneumococcal   Polio   Rubella   Small Pox   TDAP   Varicella   Zoster             Allergies:     Allergies   Allergen Reactions    Adhesive Tape Rash    Aspirin Other (See Comments)     hives    Cephalosporins Other (See Comments)     itchiness    Cyclobenzaprine Itching     Most muscle relaxers    Ibuprofen Other (See Comments)     itching    Iohexol Itching     PT WAS GIVEN OMNI 350 100 ML. HER TONGUE ITCHED AND AROUND HER LIPS.    Penicillins Other (See Comments)     Itching    Sulfa Antibiotics Other (See Comments)     itching    Covid-19 (Adenovirus) Vaccine Itching    Famotidine Itching    Gabapentin Nausea and Vomiting     irritability    Latex Itching             Medications:     Current Facility-Administered Medications   Medication Dose Route Frequency Provider Last Rate Last Admin    acetaminophen (TYLENOL) tablet 975 mg  975 mg Oral TID Quirino Peña MD   975 mg at 07/24/24 0526    brimonidine (ALPHAGAN) 0.2 % ophthalmic solution 2 drop  2 drop Right Eye BID Quirino Peña MD        cycloSPORINE  (RESTASIS) 0.05 % ophthalmic emulsion 1 drop  1 drop Both Eyes BID Quirino Peña MD        HYDROmorphone (DILAUDID) injection 0.2 mg  0.2 mg Intravenous Q2H PRQuirino Vernon MD        HYDROmorphone (DILAUDID) injection 0.4 mg  0.4 mg Intravenous Q2H PRN Quirino Peña MD        melatonin tablet 3 mg  3 mg Oral At Bedtime PRN Quirino Peña MD        naloxone (NARCAN) injection 0.2 mg  0.2 mg Intravenous Q2 Min PRQuirino Vernon MD        Or    naloxone (NARCAN) injection 0.4 mg  0.4 mg Intravenous Q2 Min PRQuirino Vernon MD        Or    naloxone (NARCAN) injection 0.2 mg  0.2 mg Intramuscular Q2 Min PRQuirino Vernon MD        Or    naloxone (NARCAN) injection 0.4 mg  0.4 mg Intramuscular Q2 Min PRQuirino Vernon MD        ondansetron (ZOFRAN ODT) ODT tab 4 mg  4 mg Oral Q6H PRN Quirino Peña MD        Or    ondansetron (ZOFRAN) injection 4 mg  4 mg Intravenous Q6H PRQuirino Vernon MD        oxyCODONE (ROXICODONE) tablet 10 mg  10 mg Oral Q4H PRQuirino Vernon MD        oxyCODONE (ROXICODONE) tablet 5 mg  5 mg Oral Q4H PRN Quirino Peña MD   5 mg at 07/24/24 0948    senna-docusate (SENOKOT-S/PERICOLACE) 8.6-50 MG per tablet 1 tablet  1 tablet Oral BID PRQuirino Vernon MD        Or    senna-docusate (SENOKOT-S/PERICOLACE) 8.6-50 MG per tablet 2 tablet  2 tablet Oral BID PRQuirino Vernon MD        tafluprost (ZIOPTAN) ophthalmic solution 1 drop  1 drop Both Eyes At Bedtime Quirino Peña MD                 Review of Systems:   ROS:  10 point ROS neg other than the symptoms noted above in the HPI.            Physical Exam:   All vitals have been reviewed  Patient Vitals for the past 24 hrs:   BP Temp Temp src Pulse Resp SpO2 Height Weight   07/24/24 0801 135/77 98  F (36.7  C) Oral 96 18 95 % -- --   07/24/24 0517 -- -- -- -- -- 96 % -- --   07/24/24 0442 -- -- -- -- -- 94 % -- --  "  07/24/24 0350 130/73 98.7  F (37.1  C) Oral 109 19 94 % -- --   07/24/24 0222 -- -- -- -- -- 94 % -- --   07/24/24 0205 (!) 155/78 99.2  F (37.3  C) Oral 119 19 92 % -- 55.7 kg (122 lb 12.7 oz)   07/24/24 0127 102/63 -- -- 99 -- 94 % -- --   07/24/24 0100 102/63 -- -- 101 -- 94 % -- --   07/24/24 0000 123/66 -- -- 94 -- 98 % -- --   07/23/24 2300 135/68 -- -- 102 -- 96 % -- --   07/23/24 2245 128/68 -- -- 104 -- 96 % -- --   07/23/24 2230 134/69 -- -- 105 -- 95 % -- --   07/23/24 2200 118/66 -- -- 106 -- 92 % -- --   07/23/24 2145 (!) 111/93 -- -- 105 -- 92 % -- --   07/23/24 2130 106/58 -- -- 97 -- 92 % -- --   07/23/24 2115 110/59 -- -- 101 -- 91 % -- --   07/23/24 2100 114/68 -- -- 106 -- 91 % -- --   07/23/24 2030 121/64 -- -- 105 -- 93 % -- --   07/23/24 2015 98/56 -- -- 104 -- 92 % -- --   07/23/24 2000 100/55 -- -- 107 -- 93 % -- --   07/23/24 1945 102/76 -- -- 117 -- 90 % -- --   07/23/24 1930 119/63 -- -- 109 -- 93 % -- --   07/23/24 1915 136/68 -- -- 113 -- 92 % -- --   07/23/24 1900 125/73 -- -- -- -- 91 % -- --   07/23/24 1724 121/71 97.5  F (36.4  C) Tympanic 95 19 94 % 1.499 m (4' 11\") 52.2 kg (115 lb)     No intake or output data in the 24 hours ending 07/24/24 1103      Physical Exam   Temp: 98  F (36.7  C) Temp src: Oral BP: 135/77 Pulse: 96   Resp: 18 SpO2: 95 % O2 Device: Nasal cannula Oxygen Delivery: 3 LPM  Vital Signs with Ranges  Temp:  [97.5  F (36.4  C)-99.2  F (37.3  C)] 98  F (36.7  C)  Pulse:  [] 96  Resp:  [18-19] 18  BP: ()/(55-93) 135/77  SpO2:  [90 %-98 %] 95 %  122 lbs 12.74 oz    Constitutional: Pleasant, alert, appropriate, following commands.  HEENT: Head atraumatic normocephalic. Pupils equal round and reactive to light.  Respiratory: Unlabored breathing no audible wheeze  Cardiovascular: Regular rate and rhythm per pulses  GI: Abdomen non-distended.  Lymph/Hematologic: No lymphadenopathy in areas examined  Genitourinary:  No taylor  Skin: No rashes, no cyanosis, no " edema.  Musculoskeletal: On physical exam of the left LE:  Skin: intact, healed anterior tibia scars from vascular surgery years prior.  No erythema.   Swelling: Mild proximal tibia swelling.  Patient does not have a knee effusion.   Alignment: neutral  Tenderness to palpation along the proximal medial tibia.  Denies lateral tenderness or pain along the distal femur.   ROM: able to range her knee 0-40 degrees with some discomfort.   Dorsi/Plantar flexion:  5/5  Calves:  Soft and non-tender  Distal pulses are intact and equal bilaterally  Sensation to light touch intact and equal bilaterally.     Neurologic: normal without focal findings, mental status, speech normal, alert and oriented x iii  Neuropsychiatric: stable            Data:   All laboratory data reviewed  Results for orders placed or performed during the hospital encounter of 07/23/24   XR Tibia and Fibula Left 2 Views     Status: None    Narrative    EXAM: XR TIBIA AND FIBULA LEFT 2 VIEWS, XR PELVIS 1/2 VIEWS, XR FEMUR LEFT 2 VIEWS, XR KNEE LEFT 1/2 VIEWS  LOCATION: [facility]  DATE: 7/23/2024    INDICATION: Fall trauma  COMPARISON: None.      Impression    IMPRESSION:     There is diffuse, severe osseous demineralization which limits radiographic evaluation for nondisplaced fractures. Additionally, the views of the left hip and proximal femur are nonstandard and limited by patient rotation as there was difficulty   positioning the patient due to patient pain.    1.  There is cortical irregularity to the anterior medial tibial plateau which likely represents a nondisplaced, acute fracture. Knee CT could confirm.  2.  There is some sclerosis involving the left femoral neck and a nondisplaced femoral neck fracture is difficult to exclude. Additionally, the pelvis is poorly evaluated due to demineralization and patient rotation. Recommend CT of the left hip/pelvis   for full evaluation.   XR Femur Left 2 Views     Status: None     Narrative    EXAM: XR TIBIA AND FIBULA LEFT 2 VIEWS, XR PELVIS 1/2 VIEWS, XR FEMUR LEFT 2 VIEWS, XR KNEE LEFT 1/2 VIEWS  LOCATION: [facility]  DATE: 7/23/2024    INDICATION: Fall trauma  COMPARISON: None.      Impression    IMPRESSION:     There is diffuse, severe osseous demineralization which limits radiographic evaluation for nondisplaced fractures. Additionally, the views of the left hip and proximal femur are nonstandard and limited by patient rotation as there was difficulty   positioning the patient due to patient pain.    1.  There is cortical irregularity to the anterior medial tibial plateau which likely represents a nondisplaced, acute fracture. Knee CT could confirm.  2.  There is some sclerosis involving the left femoral neck and a nondisplaced femoral neck fracture is difficult to exclude. Additionally, the pelvis is poorly evaluated due to demineralization and patient rotation. Recommend CT of the left hip/pelvis   for full evaluation.   XR Pelvis 1/2 Views     Status: None    Narrative    EXAM: XR TIBIA AND FIBULA LEFT 2 VIEWS, XR PELVIS 1/2 VIEWS, XR FEMUR LEFT 2 VIEWS, XR KNEE LEFT 1/2 VIEWS  LOCATION: [facility]  DATE: 7/23/2024    INDICATION: Fall trauma  COMPARISON: None.      Impression    IMPRESSION:     There is diffuse, severe osseous demineralization which limits radiographic evaluation for nondisplaced fractures. Additionally, the views of the left hip and proximal femur are nonstandard and limited by patient rotation as there was difficulty   positioning the patient due to patient pain.    1.  There is cortical irregularity to the anterior medial tibial plateau which likely represents a nondisplaced, acute fracture. Knee CT could confirm.  2.  There is some sclerosis involving the left femoral neck and a nondisplaced femoral neck fracture is difficult to exclude. Additionally, the pelvis is poorly evaluated due to demineralization and  patient rotation. Recommend CT of the left hip/pelvis   for full evaluation.   XR Knee Left 1/2 Views     Status: None    Narrative    EXAM: XR TIBIA AND FIBULA LEFT 2 VIEWS, XR PELVIS 1/2 VIEWS, XR FEMUR LEFT 2 VIEWS, XR KNEE LEFT 1/2 VIEWS  LOCATION: [facility]  DATE: 7/23/2024    INDICATION: Fall trauma  COMPARISON: None.      Impression    IMPRESSION:     There is diffuse, severe osseous demineralization which limits radiographic evaluation for nondisplaced fractures. Additionally, the views of the left hip and proximal femur are nonstandard and limited by patient rotation as there was difficulty   positioning the patient due to patient pain.    1.  There is cortical irregularity to the anterior medial tibial plateau which likely represents a nondisplaced, acute fracture. Knee CT could confirm.  2.  There is some sclerosis involving the left femoral neck and a nondisplaced femoral neck fracture is difficult to exclude. Additionally, the pelvis is poorly evaluated due to demineralization and patient rotation. Recommend CT of the left hip/pelvis   for full evaluation.   CT Pelvis Bone wo Contrast     Status: None    Narrative    EXAM: CT KNEE LEFT W/O CONTRAST, CT FEMUR THIGH LEFT W/O CONTRAST, CT PELVIS BONE WO CONTRAST  LOCATION: [facility]  DATE: 7/23/2024    INDICATION: Fall. Trauma. Rule out fracture.  COMPARISON: None.  TECHNIQUE: Noncontrast. Axial, sagittal and coronal thin-section reconstruction. Dose reduction techniques were used.     FINDINGS:     BONES:  -There is diffuse, severe osseous demineralization.     There is an acute-appearing, nondisplaced fracture of the proximal, medial tibial metaphysis. This demonstrates equivocal intra-articular extension to the medial tibial plateau, although the lack of a significant joint effusion suggests against   intra-articular extension.    No acute left femur or pelvic fracture is identified. Mild bilateral  hip degenerative arthrosis. There are also degenerative changes at the pubic symphysis.    SOFT TISSUES:  -Arterial calcifications are noted. No drainable soft tissue collection.      Impression    IMPRESSION:  1.  Acute-appearing, nondisplaced fracture of the proximal, medial tibial metaphysis. This demonstrates equivocal intra-articular extension to the medial tibial plateau, although the lack of a significant joint effusion suggests against intra-articular   extension.  2.  No acute left femur or pelvic fracture is identified. Given the degree of osseous demineralization, MRI would be more sensitive for nondisplaced fracture and should be considered if there is persistent clinical concern.  3.  Additional chronic-appearing findings, as described.   CT Femur Thigh Left w/o Contrast     Status: None    Narrative    EXAM: CT KNEE LEFT W/O CONTRAST, CT FEMUR THIGH LEFT W/O CONTRAST, CT PELVIS BONE WO CONTRAST  LOCATION: [facility]  DATE: 7/23/2024    INDICATION: Fall. Trauma. Rule out fracture.  COMPARISON: None.  TECHNIQUE: Noncontrast. Axial, sagittal and coronal thin-section reconstruction. Dose reduction techniques were used.     FINDINGS:     BONES:  -There is diffuse, severe osseous demineralization.     There is an acute-appearing, nondisplaced fracture of the proximal, medial tibial metaphysis. This demonstrates equivocal intra-articular extension to the medial tibial plateau, although the lack of a significant joint effusion suggests against   intra-articular extension.    No acute left femur or pelvic fracture is identified. Mild bilateral hip degenerative arthrosis. There are also degenerative changes at the pubic symphysis.    SOFT TISSUES:  -Arterial calcifications are noted. No drainable soft tissue collection.      Impression    IMPRESSION:  1.  Acute-appearing, nondisplaced fracture of the proximal, medial tibial metaphysis. This demonstrates equivocal intra-articular  extension to the medial tibial plateau, although the lack of a significant joint effusion suggests against intra-articular   extension.  2.  No acute left femur or pelvic fracture is identified. Given the degree of osseous demineralization, MRI would be more sensitive for nondisplaced fracture and should be considered if there is persistent clinical concern.  3.  Additional chronic-appearing findings, as described.   CT Knee Left w/o Contrast     Status: None    Narrative    EXAM: CT KNEE LEFT W/O CONTRAST, CT FEMUR THIGH LEFT W/O CONTRAST, CT PELVIS BONE WO CONTRAST  LOCATION: [facility]  DATE: 7/23/2024    INDICATION: Fall. Trauma. Rule out fracture.  COMPARISON: None.  TECHNIQUE: Noncontrast. Axial, sagittal and coronal thin-section reconstruction. Dose reduction techniques were used.     FINDINGS:     BONES:  -There is diffuse, severe osseous demineralization.     There is an acute-appearing, nondisplaced fracture of the proximal, medial tibial metaphysis. This demonstrates equivocal intra-articular extension to the medial tibial plateau, although the lack of a significant joint effusion suggests against   intra-articular extension.    No acute left femur or pelvic fracture is identified. Mild bilateral hip degenerative arthrosis. There are also degenerative changes at the pubic symphysis.    SOFT TISSUES:  -Arterial calcifications are noted. No drainable soft tissue collection.      Impression    IMPRESSION:  1.  Acute-appearing, nondisplaced fracture of the proximal, medial tibial metaphysis. This demonstrates equivocal intra-articular extension to the medial tibial plateau, although the lack of a significant joint effusion suggests against intra-articular   extension.  2.  No acute left femur or pelvic fracture is identified. Given the degree of osseous demineralization, MRI would be more sensitive for nondisplaced fracture and should be considered if there is persistent clinical  concern.  3.  Additional chronic-appearing findings, as described.   Basic metabolic panel     Status: Abnormal   Result Value Ref Range    Sodium 143 135 - 145 mmol/L    Potassium 3.8 3.4 - 5.3 mmol/L    Chloride 108 (H) 98 - 107 mmol/L    Carbon Dioxide (CO2) 26 22 - 29 mmol/L    Anion Gap 9 7 - 15 mmol/L    Urea Nitrogen 17.8 8.0 - 23.0 mg/dL    Creatinine 0.78 0.51 - 0.95 mg/dL    GFR Estimate 78 >60 mL/min/1.73m2    Calcium 9.2 8.8 - 10.4 mg/dL    Glucose 130 (H) 70 - 99 mg/dL   Glucose by meter     Status: Abnormal   Result Value Ref Range    GLUCOSE BY METER POCT 153 (H) 70 - 99 mg/dL   CBC with platelets and differential     Status: Abnormal   Result Value Ref Range    WBC Count 12.1 (H) 4.0 - 11.0 10e3/uL    RBC Count 4.71 3.80 - 5.20 10e6/uL    Hemoglobin 15.0 11.7 - 15.7 g/dL    Hematocrit 45.9 35.0 - 47.0 %    MCV 98 78 - 100 fL    MCH 31.8 26.5 - 33.0 pg    MCHC 32.7 31.5 - 36.5 g/dL    RDW 13.7 10.0 - 15.0 %    Platelet Count 156 150 - 450 10e3/uL    % Neutrophils 83 %    % Lymphocytes 10 %    % Monocytes 7 %    % Eosinophils 0 %    % Basophils 0 %    % Immature Granulocytes 0 %    NRBCs per 100 WBC 0 <1 /100    Absolute Neutrophils 10.0 (H) 1.6 - 8.3 10e3/uL    Absolute Lymphocytes 1.2 0.8 - 5.3 10e3/uL    Absolute Monocytes 0.8 0.0 - 1.3 10e3/uL    Absolute Eosinophils 0.0 0.0 - 0.7 10e3/uL    Absolute Basophils 0.0 0.0 - 0.2 10e3/uL    Absolute Immature Granulocytes 0.1 <=0.4 10e3/uL    Absolute NRBCs 0.0 10e3/uL   EKG 12-lead, tracing only     Status: None   Result Value Ref Range    Systolic Blood Pressure  mmHg    Diastolic Blood Pressure  mmHg    Ventricular Rate 104 BPM    Atrial Rate 104 BPM    AR Interval 188 ms    QRS Duration 68 ms     ms    QTc 449 ms    P Axis 64 degrees    R AXIS 90 degrees    T Axis 65 degrees    Interpretation ECG       Sinus tachycardia  Right atrial enlargement  Rightward axis  Borderline ECG  No previous ECGs available  Confirmed by GENERATED REPORT, COMPUTER  ([id number]),  Iban Colindres ([id number]) on 7/23/2024 10:50:13 PM     CBC with platelets differential     Status: Abnormal    Narrative    The following orders were created for panel order CBC with platelets differential.  Procedure                               Abnormality         Status                     ---------                               -----------         ------                     CBC with platelets and d...[[id number]]  Abnormal            Final result                 Please view results for these tests on the individual orders.          Attestation:  I have reviewed today's vital signs, notes, medications, labs and imaging with Dr. Lyle.  Amount of time performed on this consult: 50 minutes.    Linda Willson PA-C

## 2024-07-24 NOTE — PROGRESS NOTES
RECEIVING UNIT ED HANDOFF REVIEW    ED Nurse Handoff Report was reviewed by: Thea Gimenez RN on July 24, 2024 at 1:23 AM

## 2024-07-24 NOTE — PROGRESS NOTES
diagnostic tests and consults completed and resulted : on going  -vital signs normal or at patient baseline : yes  -tolerating oral intake to maintain hydration : No. On regular diet however intermittent nausea and vomiting persist.     -adequate pain control on oral analgesics : yes  -returns to baseline functional status : No. Leg brace in place on LLE. NWB on LLE.   -safe disposition plan has been identified : NO.   Nurse to notify provider when observation goals have been met and patient is ready for discharge.

## 2024-07-24 NOTE — PROGRESS NOTES
S: Lacie was seen today at the [facility] room 5513 for a locking a post-op style hinged knee brace to prevent knee hyperextension and flexion. She has a left closed fracture of proximal tibia.    O: The patient was fit with a Post-Op TSCOPE Premier on the right leg. Initial settings of the ROM are locked to full extension.    A: The thigh section was set at the minimum height setting and the below knee section was extended to just above the ankle. Having the brace end just above the ankle can help with suspension of the brace. The uprights were bent to create a more intimate fit for the patient. The straps were trimmed accordingly. Also, wearing a tall sock will increase patient comfort and reduce shear forces on the patient s skin.    P: The patient will wear the brace when weight bearing.    G: The goal is to maintain joint alignment, provide protective joint stability, prevent hyperextension and increase the patient's ability to safely perform ADLs. Please contact the [facility] Office at [phone] if you have any questions or concerns. Thank you, Lake    Electronically signed by Lake Wiggins, Board Eligible

## 2024-07-24 NOTE — PROGRESS NOTES
[facility]    Medicine Progress Note - Hospitalist Service    Date of Admission:  7/23/2024    Assessment & Plan    Lacie Choi is a markedly pleasant 78 year old woman with past medical history that is most notable for osteoporosis, as well as hypertension, hyperlipidemia, and elevated coronary artery calcium score, among others; who presents with mechanical fall and is found to have acute proximal tibial metaphysis fracture.     Acute proximal tibial metaphysis fracture: Of note, Ms. Choi has osteoporosis and is prescribed Boniva. In 2022 she underwent left ankle arthroscopy, with treatment of osteochondral injury talus, lateral ligament reconstruction, and treatment of calcaneonavicular coalition. She also reports surgical repair of her left shoulder rotator cuff last year.   Now, she presents after a mechanical fall tonight. In the ED, she has tachycardia, without hypotension or fever. She has brief hypoxia after administration of narcotics that has resolved now. X-rays of left knee, femur, tibia and fibula and pelvis are limited; CT of pelvis, left knee and left femur reveal an acute, nondisplaced fracture of the proximal, medial tibial metaphysis. This demonstrates equivocal intra-articular extension to the medial tibial plateau, although the lack of a significant joint effusion suggests against intra-articular extension to the Radiologist. No acute left femur or pelvic fracture is identified.   --Observation for pain relief.   --Fall precautions.   --Orthopedics consulted, plan for non operative management with NWB LLE with hinged knee brace locked in extension, can unlock for ROMAT when at rest. Follow up in 2 weeks for recheck at [facility]   --NWB LLE.    -- Tylenol for pain. Patient tried oxycodone and had significant nausea. She did not want to try another opioid.   --PT  --SW consulted for disposition planning.     Possible CAD: She has a remote history of positive  stress test, and later in 2019 she had a Coronary CT that showed an overall score of 912, reportedly with most of the scoring found in the LAD territory and the aorta. Reportedly she has a history of allergy to aspirin which causes hives. Noted     Hypertension: Resume home Norvasc 7/25, holding today due to dizziness      Glaucoma: Resume home eye drops           Observation Goals: -diagnostic tests and consults completed and resulted, -vital signs normal or at patient baseline, -tolerating oral intake to maintain hydration, -adequate pain control on oral analgesics, -returns to baseline functional status, -safe disposition plan has been identified, Nurse to notify provider when observation goals have been met and patient is ready for discharge.  Diet: NPO for Medical/Clinical Reasons Except for: Meds, Ice Chips    DVT Prophylaxis: Pneumatic Compression Devices, start lovenox tomorrow if still here  Maxwell Catheter: Not present  Lines: None     Cardiac Monitoring: None  Code Status: Full Code      Clinically Significant Risk Factors Present on Admission                                         Disposition Plan     Medically Ready for Discharge: 1-2 days pending improved nausea, pain control, PT eval and safe dispo            The patient's care was discussed with Dr. Coleman who agrees with the above plan     Radha Romero PA-C  Hospitalist Service  [facility]  Securely message with [employer] (more info)  Text page via [employer] Paging/Directory   ______________________________________________________________________    Interval History   Patient very nauseated at time of my visit. Tried oxycodone this morning and since taking has felt dizzy and very nauseated. Says she wants to throw up but just can't. Pain 4/10. 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     Physical Exam   Temp: 98  F (36.7  C) Temp src: Oral BP: 135/77 Pulse:  96   Resp: 18 SpO2: 95 % O2 Device: Nasal cannula Oxygen Delivery: 3 LPM  Vitals:    07/23/24 1724 07/24/24 0205   Weight: 52.2 kg (115 lb) 55.7 kg (122 lb 12.7 oz)     Vital Signs with Ranges  Temp:  [97.5  F (36.4  C)-99.2  F (37.3  C)] 98  F (36.7  C)  Pulse:  [] 96  Resp:  [18-19] 18  BP: ()/(55-93) 135/77  SpO2:  [90 %-98 %] 95 %  No intake/output data recorded.    Constitutional: Alert and oriented, laying down in bed. Appears uncomfortable clutching emesis bag. Keeps eyes closed.   ENT: moist mucous membranes  Respiratory: Lungs clear to auscultation bilaterally, no increased work of breathing  Cardiovascular: Regular rhythm with mild tachycardia   GI: active bowel sounds, abdomen soft, non-tender  MSK:  LLE with ice pack in place. Can wiggle toes  Neuro:  speech is clear. Face symmetric. Sensation intact       Medical Decision Making       60 MINUTES SPENT BY ME on the date of service doing chart review, history, exam, documentation & further activities per the note.      Data     I have personally reviewed the following data over the past 24 hrs:    12.1 (H)  \   15.0   / 156     143 108 (H) 17.8 /  130 (H)   3.8 26 0.78 \       Imaging results reviewed over the past 24 hrs:   Recent Results (from the past 24 hour(s))   XR Tibia and Fibula Left 2 Views    Narrative    EXAM: XR TIBIA AND FIBULA LEFT 2 VIEWS, XR PELVIS 1/2 VIEWS, XR FEMUR LEFT 2 VIEWS, XR KNEE LEFT 1/2 VIEWS  LOCATION: [facility]  DATE: 7/23/2024    INDICATION: Fall trauma  COMPARISON: None.      Impression    IMPRESSION:     There is diffuse, severe osseous demineralization which limits radiographic evaluation for nondisplaced fractures. Additionally, the views of the left hip and proximal femur are nonstandard and limited by patient rotation as there was difficulty   positioning the patient due to patient pain.    1.  There is cortical irregularity to the anterior medial tibial plateau which likely  represents a nondisplaced, acute fracture. Knee CT could confirm.  2.  There is some sclerosis involving the left femoral neck and a nondisplaced femoral neck fracture is difficult to exclude. Additionally, the pelvis is poorly evaluated due to demineralization and patient rotation. Recommend CT of the left hip/pelvis   for full evaluation.   XR Knee Left 1/2 Views    Narrative    EXAM: XR TIBIA AND FIBULA LEFT 2 VIEWS, XR PELVIS 1/2 VIEWS, XR FEMUR LEFT 2 VIEWS, XR KNEE LEFT 1/2 VIEWS  LOCATION: [facility]  DATE: 7/23/2024    INDICATION: Fall trauma  COMPARISON: None.      Impression    IMPRESSION:     There is diffuse, severe osseous demineralization which limits radiographic evaluation for nondisplaced fractures. Additionally, the views of the left hip and proximal femur are nonstandard and limited by patient rotation as there was difficulty   positioning the patient due to patient pain.    1.  There is cortical irregularity to the anterior medial tibial plateau which likely represents a nondisplaced, acute fracture. Knee CT could confirm.  2.  There is some sclerosis involving the left femoral neck and a nondisplaced femoral neck fracture is difficult to exclude. Additionally, the pelvis is poorly evaluated due to demineralization and patient rotation. Recommend CT of the left hip/pelvis   for full evaluation.   XR Femur Left 2 Views    Narrative    EXAM: XR TIBIA AND FIBULA LEFT 2 VIEWS, XR PELVIS 1/2 VIEWS, XR FEMUR LEFT 2 VIEWS, XR KNEE LEFT 1/2 VIEWS  LOCATION: [facility]  DATE: 7/23/2024    INDICATION: Fall trauma  COMPARISON: None.      Impression    IMPRESSION:     There is diffuse, severe osseous demineralization which limits radiographic evaluation for nondisplaced fractures. Additionally, the views of the left hip and proximal femur are nonstandard and limited by patient rotation as there was difficulty   positioning the patient due to patient  pain.    1.  There is cortical irregularity to the anterior medial tibial plateau which likely represents a nondisplaced, acute fracture. Knee CT could confirm.  2.  There is some sclerosis involving the left femoral neck and a nondisplaced femoral neck fracture is difficult to exclude. Additionally, the pelvis is poorly evaluated due to demineralization and patient rotation. Recommend CT of the left hip/pelvis   for full evaluation.   XR Pelvis 1/2 Views    Narrative    EXAM: XR TIBIA AND FIBULA LEFT 2 VIEWS, XR PELVIS 1/2 VIEWS, XR FEMUR LEFT 2 VIEWS, XR KNEE LEFT 1/2 VIEWS  LOCATION: [facility]  DATE: 7/23/2024    INDICATION: Fall trauma  COMPARISON: None.      Impression    IMPRESSION:     There is diffuse, severe osseous demineralization which limits radiographic evaluation for nondisplaced fractures. Additionally, the views of the left hip and proximal femur are nonstandard and limited by patient rotation as there was difficulty   positioning the patient due to patient pain.    1.  There is cortical irregularity to the anterior medial tibial plateau which likely represents a nondisplaced, acute fracture. Knee CT could confirm.  2.  There is some sclerosis involving the left femoral neck and a nondisplaced femoral neck fracture is difficult to exclude. Additionally, the pelvis is poorly evaluated due to demineralization and patient rotation. Recommend CT of the left hip/pelvis   for full evaluation.   CT Knee Left w/o Contrast    Narrative    EXAM: CT KNEE LEFT W/O CONTRAST, CT FEMUR THIGH LEFT W/O CONTRAST, CT PELVIS BONE WO CONTRAST  LOCATION: [facility]  DATE: 7/23/2024    INDICATION: Fall. Trauma. Rule out fracture.  COMPARISON: None.  TECHNIQUE: Noncontrast. Axial, sagittal and coronal thin-section reconstruction. Dose reduction techniques were used.     FINDINGS:     BONES:  -There is diffuse, severe osseous demineralization.     There is an  acute-appearing, nondisplaced fracture of the proximal, medial tibial metaphysis. This demonstrates equivocal intra-articular extension to the medial tibial plateau, although the lack of a significant joint effusion suggests against   intra-articular extension.    No acute left femur or pelvic fracture is identified. Mild bilateral hip degenerative arthrosis. There are also degenerative changes at the pubic symphysis.    SOFT TISSUES:  -Arterial calcifications are noted. No drainable soft tissue collection.      Impression    IMPRESSION:  1.  Acute-appearing, nondisplaced fracture of the proximal, medial tibial metaphysis. This demonstrates equivocal intra-articular extension to the medial tibial plateau, although the lack of a significant joint effusion suggests against intra-articular   extension.  2.  No acute left femur or pelvic fracture is identified. Given the degree of osseous demineralization, MRI would be more sensitive for nondisplaced fracture and should be considered if there is persistent clinical concern.  3.  Additional chronic-appearing findings, as described.   CT Femur Thigh Left w/o Contrast    Narrative    EXAM: CT KNEE LEFT W/O CONTRAST, CT FEMUR THIGH LEFT W/O CONTRAST, CT PELVIS BONE WO CONTRAST  LOCATION: [facility]  DATE: 7/23/2024    INDICATION: Fall. Trauma. Rule out fracture.  COMPARISON: None.  TECHNIQUE: Noncontrast. Axial, sagittal and coronal thin-section reconstruction. Dose reduction techniques were used.     FINDINGS:     BONES:  -There is diffuse, severe osseous demineralization.     There is an acute-appearing, nondisplaced fracture of the proximal, medial tibial metaphysis. This demonstrates equivocal intra-articular extension to the medial tibial plateau, although the lack of a significant joint effusion suggests against   intra-articular extension.    No acute left femur or pelvic fracture is identified. Mild bilateral hip degenerative arthrosis.  There are also degenerative changes at the pubic symphysis.    SOFT TISSUES:  -Arterial calcifications are noted. No drainable soft tissue collection.      Impression    IMPRESSION:  1.  Acute-appearing, nondisplaced fracture of the proximal, medial tibial metaphysis. This demonstrates equivocal intra-articular extension to the medial tibial plateau, although the lack of a significant joint effusion suggests against intra-articular   extension.  2.  No acute left femur or pelvic fracture is identified. Given the degree of osseous demineralization, MRI would be more sensitive for nondisplaced fracture and should be considered if there is persistent clinical concern.  3.  Additional chronic-appearing findings, as described.   CT Pelvis Bone wo Contrast    Narrative    EXAM: CT KNEE LEFT W/O CONTRAST, CT FEMUR THIGH LEFT W/O CONTRAST, CT PELVIS BONE WO CONTRAST  LOCATION: [facility]  DATE: 7/23/2024    INDICATION: Fall. Trauma. Rule out fracture.  COMPARISON: None.  TECHNIQUE: Noncontrast. Axial, sagittal and coronal thin-section reconstruction. Dose reduction techniques were used.     FINDINGS:     BONES:  -There is diffuse, severe osseous demineralization.     There is an acute-appearing, nondisplaced fracture of the proximal, medial tibial metaphysis. This demonstrates equivocal intra-articular extension to the medial tibial plateau, although the lack of a significant joint effusion suggests against   intra-articular extension.    No acute left femur or pelvic fracture is identified. Mild bilateral hip degenerative arthrosis. There are also degenerative changes at the pubic symphysis.    SOFT TISSUES:  -Arterial calcifications are noted. No drainable soft tissue collection.      Impression    IMPRESSION:  1.  Acute-appearing, nondisplaced fracture of the proximal, medial tibial metaphysis. This demonstrates equivocal intra-articular extension to the medial tibial plateau, although the lack  of a significant joint effusion suggests against intra-articular   extension.  2.  No acute left femur or pelvic fracture is identified. Given the degree of osseous demineralization, MRI would be more sensitive for nondisplaced fracture and should be considered if there is persistent clinical concern.  3.  Additional chronic-appearing findings, as described.

## 2024-07-24 NOTE — PROGRESS NOTES
[facility]  ED Nurse Handoff Report    ED Chief complaint: Fall (Patient presents to the ED via EMS with reports of visiting a friend and reports  falling backwards on patient. Patient reports left knee pain and left hip pain. Patient had IV placed by EMS and was given Fentanyl, Droperidol and Zofran. O2 sats dropped. Patient on 2L O2. Patients .)      ED Diagnosis:   Final diagnoses:   Left tibial fracture       Code Status: Full Code    Allergies:   Allergies   Allergen Reactions    Adhesive Tape Rash    Aspirin Other (See Comments)     hives    Cephalosporins Other (See Comments)     itchiness    Cyclobenzaprine Itching     Most muscle relaxers    Ibuprofen Other (See Comments)     itching    Iohexol Itching     PT WAS GIVEN OMNI 350 100 ML. HER TONGUE ITCHED AND AROUND HER LIPS.    Penicillins Other (See Comments)     Itching    Sulfa Antibiotics Other (See Comments)     itching    Covid-19 (Adenovirus) Vaccine Itching    Famotidine Itching    Gabapentin Nausea and Vomiting     irritability    Latex Itching       Patient Story Patient presents with left hip and left knee pain after a fall.  She was walking behind her , when he fell backwards.  She tried to catch him and unfortunately her  fell on top of her.  She landed on her left lower extremity.  She endorses left hip and left knee pain.  She was unable to ambulate after the fall.  She did not strike her head or lose consciousness.  No neck pain.  No numbness or tingling in the lower extremity.  EMS administered fentanyl, droperidol and Zofran.   Focused Assessment:  pain control and radiology    Treatments and/or interventions provided: meds, radiology  Patient's response to treatments and/or interventions: patient less nauseated and more comfortable    To be done/followed up on inpatient unit:  surgery consult. NPO after 12MN    Does this patient have any cognitive concerns?:  none    Activity level -  Baseline/Home:  Independent  Activity Level - Current:    bedrest    Patient's Preferred language: English   Needed?: No    Isolation: None  Infection: Not Applicable  Patient tested for COVID 19 prior to admission: NO  Bariatric?: No    Vital Signs:   Vitals:    07/23/24 2145 07/23/24 2200 07/23/24 2230 07/23/24 2245   BP: (!) 111/93 118/66 134/69 128/68   Pulse: 105 106 105 104   Resp:       Temp:       TempSrc:       SpO2: 92% 92% 95% 96%   Weight:       Height:           Cardiac Rhythm:     Was the PSS-3 completed:   Yes  What interventions are required if any?               Family Comments: supportive   OBS brochure/video discussed/provided to patient/family: N/A              Name of person given brochure if not patient:               Relationship to patient:     For the majority of the shift this patient's behavior was Green.   Behavioral interventions performed were none.    ED NURSE PHONE NUMBER: [phone]

## 2024-07-24 NOTE — CONSULTS
Care Management Initial Consult    General Information  Assessment completed with: PatientLacie  Type of CM/SW Visit: Initial Assessment    Primary Care Provider verified and updated as needed: Yes , Dr Nuñez at [facility] in [place]  Readmission within the last 30 days: no previous admission in last 30 days      Reason for Consult: discharge planning  Advance Care Planning: Advance Care Planning Reviewed: concerns discussed          Communication Assessment  Patient's communication style: spoken language (English or Bilingual)    Hearing Difficulty or Deaf: yes   Wear Glasses or Blind: yes    Cognitive  Cognitive/Neuro/Behavioral: WDL                      Living Environment:   People in home: spouse     Current living Arrangements: house      Able to return to prior arrangements: other (see comments)       Family/Social Support:  Care provided by: self  Provides care for: no one  Marital Status:              Description of Support System:           Current Resources:   Patient receiving home care services: No     Community Resources: Other (see comment) (outpatient PT)  Equipment currently used at home:    Supplies currently used at home:      Employment/Financial:  Employment Status: retired        Financial Concerns: none           Does the patient's insurance plan have a 3 day qualifying hospital stay waiver?  Yes     Which insurance plan 3 day waiver is available? Alternative insurance waiver    Will the waiver be used for post-acute placement? Undetermined at this time    Lifestyle & Psychosocial Needs:  Social Determinants of Health     Food Insecurity: No Food Insecurity (6/25/2024)    Received from [facility]    Food Insecurity     Worried About Running Out of Food in the Last Year: 1   Depression: Not at risk (3/11/2024)    Received from [facility]    PHQ-2     PHQ-2 TOTAL SCORE: 1   Housing Stability: Low Risk  (6/25/2024)  "   Received from [facility]    Housing Stability     Unable to Pay for Housing in the Last Year: 1   Tobacco Use: Medium Risk (7/23/2024)    Patient History     Smoking Tobacco Use: Former     Smokeless Tobacco Use: Unknown     Passive Exposure: Not on file   Financial Resource Strain: Low Risk  (6/25/2024)    Received from [facility]    Financial Resource Strain     Difficulty of Paying Living Expenses: 3     Difficulty of Paying Living Expenses: Not on file   Alcohol Use: Not on file   Transportation Needs: No Transportation Needs (6/25/2024)    Received from [facility]    Transportation Needs     Lack of Transportation (Medical): 1   Physical Activity: Not on file   Interpersonal Safety: Not on file   Stress: Not on file   Social Connections: Socially Integrated (6/25/2024)    Received from [facility]    Social Connections     Frequency of Communication with Friends and Family: 0   Health Literacy: Not on file       Functional Status:  Prior to admission patient needed assistance:   Dependent ADLs:: Independent  Dependent IADLs:: Independent       Mental Health Status:  Mental Health Status: No Current Concerns       Chemical Dependency Status:      unknown          Values/Beliefs:  Spiritual, Cultural Beliefs, Roman [employer] Practices, Values that affect care: no               Additional Information:  -writer met with patient at bedside. Consult placed for discharge planning. Writer introduced self and role. Patient complains of nausea. \"Its' the opoid I got, I am so sensitive to medications.\"  -patient tells writer she is also\"Out of it\" but wanted to answer questions writer had.  Patient lives in a house in [place], [place] . She attends outpatient PT.She tells writer \"no surgery for now\". She will have to work with PT once her nausea is controlled.  Will follow for discharge " planning.      Erica Hooks RN

## 2024-07-24 NOTE — PROVIDER NOTIFICATION
"MD Notification    Notified Person: MD    Notified Person Name: Dr. Peña    Notification Date/Time: 7/24 @ 0359    Notification Interaction: Vocera message     Purpose of Notification: Hi, pt is kind of anxious and shakey. Denies being \"cold but can't stop shaking, body feels frozen\". Declined interventions for ongoing nausea and pain. She's a bit tachy in 110's, 94% on 3L NC, all other VSS. Are we able to give her something low dose for anxiety?     Orders Received:    Comments:   "

## 2024-07-24 NOTE — H&P
[facility]    History and Physical  Hospitalist       Date of Admission:  7/23/2024  Date of Service (when I saw the patient): 07/23/24    ASSESSMENT  Lacie Choi is a markedly pleasant 78 year old woman with past medical history that is most notable for osteoporosis, as well as hypertension, hyperlipidemia, and elevated coronary artery calcium score, among others; who presents with mechanical fall and is found to have acute proximal tibial metaphysis fracture.    PLAN     Acute proximal tibial metaphysis fracture: Of note, Ms. Choi has osteoporosis and is prescribed Boniva. In 2022 she underwent left ankle arthroscopy, with treatment of osteochondral injury talus, lateral ligament reconstruction, and treatment of calcaneonavicular coalition. She also reports surgical repair of her left shoulder rotator cuff last year.     Now, she presents after a mechanical fall tonight. In the ED, she has tachycardia, without hypotension or fever. She has brief hypoxia after administration of narcotics that has resolved now. X-rays of left knee, femur, tibia and fibula and pelvis are limited; CT of pelvis, left knee and left femur reveal an acute, nondisplaced fracture of the proximal, medial tibial metaphysis. This demonstrates equivocal intra-articular extension to the medial tibial plateau, although the lack of a significant joint effusion suggests against intra-articular extension to the Radiologist. No acute left femur or pelvic fracture is identified.        -- Observation for pain relief. Fall precautions. NPO after midnight. Orthopedics consulted. NWB LLE.    -- Tylenol, Oxycodone, IV Dilaudid as needed for pain. Anti-emetics as needed. She reports having had allergies to Neurontin and most muscle relaxants in the past.    -- CBC and BMP ordered. SW consulted for disposition planning.    Aurea-operative Risk Assessment: RCRI 1 with Functional Status greater than 4 METS going for  intermediate risk procedure for CV events. EKG shows sinus tachycardia. Recommend to proceed to urgent surgery without further cardiac intervention or testing.    Possible CAD: She has a remote history of positive stress test, and later in 2019 she had a Coronary CT that showed an overall score of 912, reportedly with most of the scoring found in the LAD territory and the aorta. Reportedly she has a history of allergy to aspirin which causes hives. Noted    Hypertension: Resume home Norvasc when verified    Glaucoma: Resume home eye drops when verified.    I have spent 55 minutes on the date of service doing chart review, history, examination, documentation, and further activities per the note.    Chief Complaint   Left leg pain    History is obtained from the patient and the ED physician whom I have spoken with    History of Present Illness   Lacie Choi is a markedly pleasant 78 year old woman who presents with sudden onset of pain in her left leg that has been constant and severe since she fell this evening. She was at a friend's house with her  celebrating a reunion, and was following her  up some stairs when he slipped and fell onto her, causing her to fall onto her leg. Any movement of the leg exacerbates the pain. she received Fenatanyl, Droperidol, and Zofran in the field, and IV Fentanyl, Morphine, Zofran, and Compazine given in the ED have partially improved her symptoms. 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 She has developed a number of serious reactions to multiple medications in the past, and we discussed her concerns for possibly developing such reactions to pain medications while she is here. She adds that normally she is able to walk up a flight of stirs without getting dyspnea or chest pain. She other wise denies any other acute complaints.    In the ED,   07/23 1724 121/71  97.5  F (36.4  C) 95 19 94 %     EKG showed sinus tachycardia    Recent Results (from the past 24 hour(s))   XR Tibia and Fibula Left 2 Views    Narrative    EXAM: XR TIBIA AND FIBULA LEFT 2 VIEWS, XR PELVIS 1/2 VIEWS, XR FEMUR LEFT 2 VIEWS, XR KNEE LEFT 1/2 VIEWS  LOCATION: [facility]  DATE: 7/23/2024    INDICATION: Fall trauma  COMPARISON: None.      Impression    IMPRESSION:     There is diffuse, severe osseous demineralization which limits radiographic evaluation for nondisplaced fractures. Additionally, the views of the left hip and proximal femur are nonstandard and limited by patient rotation as there was difficulty   positioning the patient due to patient pain.    1.  There is cortical irregularity to the anterior medial tibial plateau which likely represents a nondisplaced, acute fracture. Knee CT could confirm.  2.  There is some sclerosis involving the left femoral neck and a nondisplaced femoral neck fracture is difficult to exclude. Additionally, the pelvis is poorly evaluated due to demineralization and patient rotation. Recommend CT of the left hip/pelvis   for full evaluation.   XR Knee Left 1/2 Views    Narrative    EXAM: XR TIBIA AND FIBULA LEFT 2 VIEWS, XR PELVIS 1/2 VIEWS, XR FEMUR LEFT 2 VIEWS, XR KNEE LEFT 1/2 VIEWS  LOCATION: [facility]  DATE: 7/23/2024    INDICATION: Fall trauma  COMPARISON: None.      Impression    IMPRESSION:     There is diffuse, severe osseous demineralization which limits radiographic evaluation for nondisplaced fractures. Additionally, the views of the left hip and proximal femur are nonstandard and limited by patient rotation as there was difficulty   positioning the patient due to patient pain.    1.  There is cortical irregularity to the anterior medial tibial plateau which likely represents a nondisplaced, acute fracture. Knee CT could confirm.  2.  There is some sclerosis involving the left femoral neck and a  nondisplaced femoral neck fracture is difficult to exclude. Additionally, the pelvis is poorly evaluated due to demineralization and patient rotation. Recommend CT of the left hip/pelvis   for full evaluation.   XR Femur Left 2 Views    Narrative    EXAM: XR TIBIA AND FIBULA LEFT 2 VIEWS, XR PELVIS 1/2 VIEWS, XR FEMUR LEFT 2 VIEWS, XR KNEE LEFT 1/2 VIEWS  LOCATION: [facility]  DATE: 7/23/2024    INDICATION: Fall trauma  COMPARISON: None.      Impression    IMPRESSION:     There is diffuse, severe osseous demineralization which limits radiographic evaluation for nondisplaced fractures. Additionally, the views of the left hip and proximal femur are nonstandard and limited by patient rotation as there was difficulty   positioning the patient due to patient pain.    1.  There is cortical irregularity to the anterior medial tibial plateau which likely represents a nondisplaced, acute fracture. Knee CT could confirm.  2.  There is some sclerosis involving the left femoral neck and a nondisplaced femoral neck fracture is difficult to exclude. Additionally, the pelvis is poorly evaluated due to demineralization and patient rotation. Recommend CT of the left hip/pelvis   for full evaluation.   XR Pelvis 1/2 Views    Narrative    EXAM: XR TIBIA AND FIBULA LEFT 2 VIEWS, XR PELVIS 1/2 VIEWS, XR FEMUR LEFT 2 VIEWS, XR KNEE LEFT 1/2 VIEWS  LOCATION: [facility]  DATE: 7/23/2024    INDICATION: Fall trauma  COMPARISON: None.      Impression    IMPRESSION:     There is diffuse, severe osseous demineralization which limits radiographic evaluation for nondisplaced fractures. Additionally, the views of the left hip and proximal femur are nonstandard and limited by patient rotation as there was difficulty   positioning the patient due to patient pain.    1.  There is cortical irregularity to the anterior medial tibial plateau which likely represents a nondisplaced, acute fracture. Knee  CT could confirm.  2.  There is some sclerosis involving the left femoral neck and a nondisplaced femoral neck fracture is difficult to exclude. Additionally, the pelvis is poorly evaluated due to demineralization and patient rotation. Recommend CT of the left hip/pelvis   for full evaluation.   CT Knee Left w/o Contrast    Narrative    EXAM: CT KNEE LEFT W/O CONTRAST, CT FEMUR THIGH LEFT W/O CONTRAST, CT PELVIS BONE WO CONTRAST  LOCATION: [facility]  DATE: 7/23/2024    INDICATION: Fall. Trauma. Rule out fracture.  COMPARISON: None.  TECHNIQUE: Noncontrast. Axial, sagittal and coronal thin-section reconstruction. Dose reduction techniques were used.     FINDINGS:     BONES:  -There is diffuse, severe osseous demineralization.     There is an acute-appearing, nondisplaced fracture of the proximal, medial tibial metaphysis. This demonstrates equivocal intra-articular extension to the medial tibial plateau, although the lack of a significant joint effusion suggests against   intra-articular extension.    No acute left femur or pelvic fracture is identified. Mild bilateral hip degenerative arthrosis. There are also degenerative changes at the pubic symphysis.    SOFT TISSUES:  -Arterial calcifications are noted. No drainable soft tissue collection.      Impression    IMPRESSION:  1.  Acute-appearing, nondisplaced fracture of the proximal, medial tibial metaphysis. This demonstrates equivocal intra-articular extension to the medial tibial plateau, although the lack of a significant joint effusion suggests against intra-articular   extension.  2.  No acute left femur or pelvic fracture is identified. Given the degree of osseous demineralization, MRI would be more sensitive for nondisplaced fracture and should be considered if there is persistent clinical concern.  3.  Additional chronic-appearing findings, as described.   CT Femur Thigh Left w/o Contrast    Narrative    EXAM: CT KNEE LEFT W/O  CONTRAST, CT FEMUR THIGH LEFT W/O CONTRAST, CT PELVIS BONE WO CONTRAST  LOCATION: [facility]  DATE: 7/23/2024    INDICATION: Fall. Trauma. Rule out fracture.  COMPARISON: None.  TECHNIQUE: Noncontrast. Axial, sagittal and coronal thin-section reconstruction. Dose reduction techniques were used.     FINDINGS:     BONES:  -There is diffuse, severe osseous demineralization.     There is an acute-appearing, nondisplaced fracture of the proximal, medial tibial metaphysis. This demonstrates equivocal intra-articular extension to the medial tibial plateau, although the lack of a significant joint effusion suggests against   intra-articular extension.    No acute left femur or pelvic fracture is identified. Mild bilateral hip degenerative arthrosis. There are also degenerative changes at the pubic symphysis.    SOFT TISSUES:  -Arterial calcifications are noted. No drainable soft tissue collection.      Impression    IMPRESSION:  1.  Acute-appearing, nondisplaced fracture of the proximal, medial tibial metaphysis. This demonstrates equivocal intra-articular extension to the medial tibial plateau, although the lack of a significant joint effusion suggests against intra-articular   extension.  2.  No acute left femur or pelvic fracture is identified. Given the degree of osseous demineralization, MRI would be more sensitive for nondisplaced fracture and should be considered if there is persistent clinical concern.  3.  Additional chronic-appearing findings, as described.   CT Pelvis Bone wo Contrast    Narrative    EXAM: CT KNEE LEFT W/O CONTRAST, CT FEMUR THIGH LEFT W/O CONTRAST, CT PELVIS BONE WO CONTRAST  LOCATION: [facility]  DATE: 7/23/2024    INDICATION: Fall. Trauma. Rule out fracture.  COMPARISON: None.  TECHNIQUE: Noncontrast. Axial, sagittal and coronal thin-section reconstruction. Dose reduction techniques were used.     FINDINGS:     BONES:  -There is diffuse,  "severe osseous demineralization.     There is an acute-appearing, nondisplaced fracture of the proximal, medial tibial metaphysis. This demonstrates equivocal intra-articular extension to the medial tibial plateau, although the lack of a significant joint effusion suggests against   intra-articular extension.    No acute left femur or pelvic fracture is identified. Mild bilateral hip degenerative arthrosis. There are also degenerative changes at the pubic symphysis.    SOFT TISSUES:  -Arterial calcifications are noted. No drainable soft tissue collection.      Impression    IMPRESSION:  1.  Acute-appearing, nondisplaced fracture of the proximal, medial tibial metaphysis. This demonstrates equivocal intra-articular extension to the medial tibial plateau, although the lack of a significant joint effusion suggests against intra-articular   extension.  2.  No acute left femur or pelvic fracture is identified. Given the degree of osseous demineralization, MRI would be more sensitive for nondisplaced fracture and should be considered if there is persistent clinical concern.  3.  Additional chronic-appearing findings, as described.       PHYSICAL EXAM  Blood pressure 110/59, pulse 101, temperature 97.5  F (36.4  C), temperature source Tympanic, resp. rate 19, height 1.499 m (4' 11\"), weight 52.2 kg (115 lb), SpO2 91%.  Constitutional: Alert and oriented to person, place and time; no apparent distress  Respiratory: lungs clear to auscultation bilaterally  Cardiovascular: regular S1 S2  GI: abdomen soft non tender non distended bowel sounds positive     DVT Prophylaxis: Pneumatic Compression Devices  Code Status: Full Code    Disposition: Expected discharge in 0-4 days pending surgical assessment    Quirino Peña MD, MD    Past Medical History    I have reviewed this patient's medical history and updated it with pertinent information if needed.   Past Medical History:   Diagnosis Date    Ankle instability, left     " Prior stress fracture    Anxiety     Cancer, uterine (H)     Status post DICK BSO remotely    Diverticulosis of large intestine     Elevated coronary artery calcium score 2019    Glaucoma     HTN (hypertension)     Hyperlipidemia     Lichen sclerosus     Migraine     Nephrolithiasis 2011    Osteochondral defect of ankle     Osteoporosis     Raynaud's syndrome     Reactive gastropathy 2021    On EGD    Squamous cell carcinoma, leg, right 2022    Tarsal coalition of left foot        Past Surgical History   I have reviewed this patient's surgical history and updated it with pertinent information if needed.  Past Surgical History:   Procedure Laterality Date    APPENDECTOMY      ARTHROSCOPY ANKLE Left 08/30/2022    Left ankle arthroscopy, treatment of osteochondral injury talus, lateral ligament reconstruction, treatment of calcaneonavicular coaltion    DILATION AND CURETTAGE      HYSTERECTOMY TOTAL ABDOMINAL, BILATERAL SALPINGO-OOPHORECTOMY, COMBINED  1988    MOHS MICROGRAPHIC PROCEDURE  2005    TONSILLECTOMY      TUBAL LIGATION         Prior to Admission Medications      Need to be reconciled but include, per her report:    1) Amlodipine  2) Boniva  3) Multiple eye drops  4) Multiple supplements such as Coenzyme q and turmeric    Among others    Allergies   Allergies   Allergen Reactions    Adhesive Tape Rash    Aspirin Other (See Comments)     hives    Cephalosporins Other (See Comments)     itchiness    Cyclobenzaprine Itching     Most muscle relaxers    Ibuprofen Other (See Comments)     itching    Iohexol Itching     PT WAS GIVEN OMNI 350 100 ML. HER TONGUE ITCHED AND AROUND HER LIPS.    Penicillins Other (See Comments)     Itching    Sulfa Antibiotics Other (See Comments)     itching    Covid-19 (Adenovirus) Vaccine Itching    Famotidine Itching    Gabapentin Nausea and Vomiting     irritability    Latex Itching       Social History   I have reviewed this patient's social history and updated it with pertinent  information if needed. Lacie TOWNSEND Lonnietheresa  reports that she has quit smoking. Her smoking use included cigarettes. She does not have any smokeless tobacco history on file. She reports current alcohol use.    Family History   Family history assessed and, except as above, is non-contributory.    Review of Systems   The 10 point Review of Systems is negative other than noted in the HPI or here.     Primary Care Physician   Patricia Selby    Data   Labs Ordered and Resulted from Time of ED Arrival to Time of ED Departure - No data to display    Data reviewed today:  I personally reviewed the EKG tracing showing sinus tachycardia .

## 2024-07-24 NOTE — PHARMACY-ADMISSION MEDICATION HISTORY
Pharmacist Admission Medication History    Admission medication history is complete. The information provided in this note is only as accurate as the sources available at the time of the update.    Information Source(s): Patient via in-person    Pertinent Information:     Changes made to PTA medication list:  Added: All entries  Deleted: None  Changed: None    Allergies reviewed with patient and updates made in EHR: no    Medication History Completed By: Mitesh Thomas [facility] 7/23/2024 11:00 PM    PTA Med List   Medication Sig Last Dose    acetaminophen (TYLENOL) 500 MG tablet Take 1,000 mg by mouth every 6 hours as needed for mild pain  at PRN    amLODIPine (NORVASC) 5 MG tablet Take 5 mg by mouth daily 7/23/2024 at AM    brimonidine (ALPHAGAN) 0.2 % ophthalmic solution Place 2 drops into the right eye 2 times daily 7/23/2024 at AM    calcium carbonate-vitamin D (OSCAL) 500-5 MG-MCG tablet Take 2 tablets by mouth 2 times daily 7/23/2024 at AM    clobetasol propionate (TEMOVATE) 0.05 % external cream Apply topically twice a week Past Week    cycloSPORINE (RESTASIS) 0.05 % ophthalmic emulsion Place 1 drop into both eyes 2 times daily 7/23/2024    IBANdronate (BONIVA) 150 MG tablet Take 150 mg by mouth every 30 days Past Month    MAGNESIUM GLUCONATE PO Take 100 mg by mouth every evening 7/22/2024 at PM    multivitamin w/minerals (THERA-VIT-M) tablet Take 1 tablet by mouth daily 7/23/2024 at AM    SUMAtriptan (IMITREX) 25 MG tablet Take 25 mg by mouth at onset of headache for migraine  at PRN    tafluprost (ZIOPTAN) 0.0015 % SOLN ophthalmic solution Place 1 drop into both eyes at bedtime 7/22/2024 at HS    TURMERIC PO Take 500 mg by mouth 2 times daily 7/23/2024 at AM

## 2024-07-24 NOTE — PROGRESS NOTES
diagnostic tests and consults completed and resulted : on going  -vital signs normal or at patient baseline : yes  -tolerating oral intake to maintain hydration : No. NPO.   -adequate pain control on oral analgesics : yes  -returns to baseline functional status : No. TBD following Ortho consult.   -safe disposition plan has been identified : NO.   Nurse to notify provider when observation goals have been met and patient is ready for discharge.

## 2024-07-24 NOTE — PLAN OF CARE
Goal Outcome Evaluation:    PRIMARY Concern: L tibial fracture after fall  SAFETY RISK Concerns (fall risk, behaviors, etc.): Falls risk, Behavior: Green      Isolation/Type: None  Tests/Procedures for NEXT shift: Monitor O2 levels  Consults? (Pending/following, signed-off?) SW consulted, Orthopaedic Surgery Consulted  Where is patient from? (Home, TCU, etc.): Home w/   Other Important info for NEXT shift: Pt very anxious, has tremor pt states she had this PTA, afraid to take pain and nausea medications even after education.  Anticipated DC date & active delays: TBD  __________________________________________________________________  SUMMARY NOTE:  Orientation/Cognitive: A&Ox4  Observation Goals (Met/ Not Met): Not met  Mobility Level/Assist Equipment: Not OOB yet, NWB LLE  Antibiotics & Plan (IV/po, length of tx left): None  Pain Management: Jacqui tyl given early this shift, declined antiemetic   Tele/VS/O2: VS ex tachy in 100s-110s, on 3L NC O2  ABNL Lab/BG: , see chart  Diet: NPO ex ice chips  Bowel/Bladder: Purewick, voiding  Skin Concerns: scattered bruising   Drains/Devices: PIV SL, Purewick  Patient Stated Goal for Today: Feel better         Observation goals  PRIOR TO DISCHARGE        Comments:   -diagnostic tests and consults completed and resulted not met  -vital signs normal or at patient baseline not met, on 3L NC O2  -tolerating oral intake to maintain hydration not met, NPO  -adequate pain control on oral analgesics partially met, refuses pain meds, ok w/ tyl  -returns to baseline functional status not met  -safe disposition plan has been identified not met  Nurse to notify provider when observation goals have been met and patient is ready for discharge.

## 2024-07-24 NOTE — PROVIDER NOTIFICATION
MD Notification    Notified Person: MD    Notified Person Name:  Mariana    Notification Date/Time: 7/24 @ 0520    Notification Interaction: Vocera    Purpose of Notification: Pt requests tyl for moderate pain, no PRN avail.    Orders Received: MD approved to give AM Dose early.    Comments:

## 2024-07-25 ENCOUNTER — APPOINTMENT (OUTPATIENT)
Dept: CT IMAGING | Facility: CLINIC | Age: 79
DRG: 562 | End: 2024-07-25
Attending: PHYSICIAN ASSISTANT
Payer: MEDICARE

## 2024-07-25 ENCOUNTER — APPOINTMENT (OUTPATIENT)
Dept: GENERAL RADIOLOGY | Facility: CLINIC | Age: 79
DRG: 562 | End: 2024-07-25
Attending: STUDENT IN AN ORGANIZED HEALTH CARE EDUCATION/TRAINING PROGRAM
Payer: MEDICARE

## 2024-07-25 ENCOUNTER — APPOINTMENT (OUTPATIENT)
Dept: PHYSICAL THERAPY | Facility: CLINIC | Age: 79
DRG: 562 | End: 2024-07-25
Payer: MEDICARE

## 2024-07-25 LAB
ALBUMIN UR-MCNC: NEGATIVE MG/DL
APPEARANCE UR: ABNORMAL
BACTERIA #/AREA URNS HPF: ABNORMAL /HPF
BILIRUB UR QL STRIP: NEGATIVE
COLOR UR AUTO: ABNORMAL
ERYTHROCYTE [DISTWIDTH] IN BLOOD BY AUTOMATED COUNT: 13.7 % (ref 10–15)
GLUCOSE UR STRIP-MCNC: NEGATIVE MG/DL
HCT VFR BLD AUTO: 42.2 % (ref 35–47)
HGB BLD-MCNC: 13.6 G/DL (ref 11.7–15.7)
HGB UR QL STRIP: NEGATIVE
KETONES UR STRIP-MCNC: ABNORMAL MG/DL
LEUKOCYTE ESTERASE UR QL STRIP: NEGATIVE
MCH RBC QN AUTO: 32 PG (ref 26.5–33)
MCHC RBC AUTO-ENTMCNC: 32.2 G/DL (ref 31.5–36.5)
MCV RBC AUTO: 99 FL (ref 78–100)
MUCOUS THREADS #/AREA URNS LPF: PRESENT /LPF
NITRATE UR QL: POSITIVE
PH UR STRIP: 7 [PH] (ref 5–7)
PLATELET # BLD AUTO: 132 10E3/UL (ref 150–450)
RBC # BLD AUTO: 4.25 10E6/UL (ref 3.8–5.2)
RBC URINE: <1 /HPF
SP GR UR STRIP: 1.01 (ref 1–1.03)
SQUAMOUS EPITHELIAL: 1 /HPF
TROPONIN T SERPL HS-MCNC: 14 NG/L
UROBILINOGEN UR STRIP-MCNC: NORMAL MG/DL
WBC # BLD AUTO: 9.9 10E3/UL (ref 4–11)
WBC URINE: <1 /HPF

## 2024-07-25 PROCEDURE — G0378 HOSPITAL OBSERVATION PER HR: HCPCS

## 2024-07-25 PROCEDURE — 36415 COLL VENOUS BLD VENIPUNCTURE: CPT | Performed by: PHYSICIAN ASSISTANT

## 2024-07-25 PROCEDURE — 71045 X-RAY EXAM CHEST 1 VIEW: CPT

## 2024-07-25 PROCEDURE — 250N000013 HC RX MED GY IP 250 OP 250 PS 637: Performed by: HOSPITALIST

## 2024-07-25 PROCEDURE — 84484 ASSAY OF TROPONIN QUANT: CPT | Performed by: PHYSICIAN ASSISTANT

## 2024-07-25 PROCEDURE — 70450 CT HEAD/BRAIN W/O DYE: CPT

## 2024-07-25 PROCEDURE — 96361 HYDRATE IV INFUSION ADD-ON: CPT

## 2024-07-25 PROCEDURE — 85018 HEMOGLOBIN: CPT | Performed by: PHYSICIAN ASSISTANT

## 2024-07-25 PROCEDURE — 81001 URINALYSIS AUTO W/SCOPE: CPT | Performed by: PHYSICIAN ASSISTANT

## 2024-07-25 PROCEDURE — 250N000013 HC RX MED GY IP 250 OP 250 PS 637: Performed by: PHYSICIAN ASSISTANT

## 2024-07-25 PROCEDURE — 99233 SBSQ HOSP IP/OBS HIGH 50: CPT | Performed by: PHYSICIAN ASSISTANT

## 2024-07-25 PROCEDURE — 87086 URINE CULTURE/COLONY COUNT: CPT | Performed by: PHYSICIAN ASSISTANT

## 2024-07-25 PROCEDURE — 258N000003 HC RX IP 258 OP 636: Performed by: PHYSICIAN ASSISTANT

## 2024-07-25 PROCEDURE — 250N000013 HC RX MED GY IP 250 OP 250 PS 637: Performed by: STUDENT IN AN ORGANIZED HEALTH CARE EDUCATION/TRAINING PROGRAM

## 2024-07-25 PROCEDURE — 97530 THERAPEUTIC ACTIVITIES: CPT | Mod: GP

## 2024-07-25 RX ORDER — ENOXAPARIN SODIUM 100 MG/ML
40 INJECTION SUBCUTANEOUS EVERY 24 HOURS
Status: DISCONTINUED | OUTPATIENT
Start: 2024-07-25 | End: 2024-07-26

## 2024-07-25 RX ORDER — ACETAMINOPHEN 650 MG/1
650 SUPPOSITORY RECTAL EVERY 4 HOURS PRN
Status: DISCONTINUED | OUTPATIENT
Start: 2024-07-25 | End: 2024-07-27

## 2024-07-25 RX ORDER — LORAZEPAM 0.5 MG/1
0.25 TABLET ORAL EVERY 6 HOURS PRN
Status: DISCONTINUED | OUTPATIENT
Start: 2024-07-25 | End: 2024-07-30 | Stop reason: HOSPADM

## 2024-07-25 RX ORDER — MECLIZINE HCL 12.5 MG 12.5 MG/1
12.5 TABLET ORAL 3 TIMES DAILY PRN
Status: DISCONTINUED | OUTPATIENT
Start: 2024-07-25 | End: 2024-07-25

## 2024-07-25 RX ORDER — IPRATROPIUM BROMIDE AND ALBUTEROL SULFATE 2.5; .5 MG/3ML; MG/3ML
3 SOLUTION RESPIRATORY (INHALATION) EVERY 4 HOURS PRN
Status: DISCONTINUED | OUTPATIENT
Start: 2024-07-25 | End: 2024-07-30 | Stop reason: HOSPADM

## 2024-07-25 RX ORDER — SODIUM CHLORIDE 9 MG/ML
INJECTION, SOLUTION INTRAVENOUS CONTINUOUS
Status: DISCONTINUED | OUTPATIENT
Start: 2024-07-25 | End: 2024-07-26

## 2024-07-25 RX ORDER — ACETAMINOPHEN 325 MG/1
650 TABLET ORAL EVERY 4 HOURS PRN
Status: DISCONTINUED | OUTPATIENT
Start: 2024-07-25 | End: 2024-07-27

## 2024-07-25 RX ORDER — MECLIZINE HCL 12.5 MG 12.5 MG/1
12.5-25 TABLET ORAL 3 TIMES DAILY PRN
Status: DISCONTINUED | OUTPATIENT
Start: 2024-07-25 | End: 2024-07-30 | Stop reason: HOSPADM

## 2024-07-25 RX ADMIN — ACETAMINOPHEN 650 MG: 325 TABLET, FILM COATED ORAL at 21:43

## 2024-07-25 RX ADMIN — CARBOXYMETHYLCELLULOSE SODIUM 1 DROP: 5 SOLUTION/ DROPS OPHTHALMIC at 08:11

## 2024-07-25 RX ADMIN — SODIUM CHLORIDE 500 ML: 9 INJECTION, SOLUTION INTRAVENOUS at 16:46

## 2024-07-25 RX ADMIN — ACETAMINOPHEN 975 MG: 325 TABLET, FILM COATED ORAL at 03:50

## 2024-07-25 RX ADMIN — MECLIZINE 12.5 MG: 12.5 TABLET ORAL at 12:58

## 2024-07-25 RX ADMIN — MECLIZINE 25 MG: 12.5 TABLET ORAL at 23:14

## 2024-07-25 RX ADMIN — BRIMONIDINE TARTRATE 2 DROP: 2 SOLUTION/ DROPS OPHTHALMIC at 08:11

## 2024-07-25 RX ADMIN — SODIUM CHLORIDE: 9 INJECTION, SOLUTION INTRAVENOUS at 19:09

## 2024-07-25 RX ADMIN — CARBOXYMETHYLCELLULOSE SODIUM 1 DROP: 5 SOLUTION/ DROPS OPHTHALMIC at 21:43

## 2024-07-25 RX ADMIN — ACETAMINOPHEN 975 MG: 325 TABLET, FILM COATED ORAL at 12:15

## 2024-07-25 RX ADMIN — BRIMONIDINE TARTRATE 2 DROP: 2 SOLUTION/ DROPS OPHTHALMIC at 21:43

## 2024-07-25 RX ADMIN — LATANOPROST 1 DROP: 50 SOLUTION OPHTHALMIC at 21:43

## 2024-07-25 ASSESSMENT — ACTIVITIES OF DAILY LIVING (ADL)
ADLS_ACUITY_SCORE: 51

## 2024-07-25 NOTE — PROGRESS NOTES
[facility]    Medicine Progress Note - Hospitalist Service    Date of Admission:  7/23/2024    Assessment & Plan   Lacie Choi is a markedly pleasant 78 year old woman with past medical history that is most notable for osteoporosis, as well as hypertension, hyperlipidemia, and elevated coronary artery calcium score, among others; who presents with mechanical fall and is found to have acute proximal tibial metaphysis fracture.     Acute proximal tibial metaphysis fracture: Of note, Ms. Choi has osteoporosis and is prescribed Boniva. In 2022 she underwent left ankle arthroscopy, with treatment of osteochondral injury talus, lateral ligament reconstruction, and treatment of calcaneonavicular coalition. She also reports surgical repair of her left shoulder rotator cuff last year.   Now, she presents after a mechanical fall tonight. In the ED, she has tachycardia, without hypotension or fever. She has brief hypoxia after administration of narcotics that has resolved now. X-rays of left knee, femur, tibia and fibula and pelvis are limited; CT of pelvis, left knee and left femur reveal an acute, nondisplaced fracture of the proximal, medial tibial metaphysis. This demonstrates equivocal intra-articular extension to the medial tibial plateau, although the lack of a significant joint effusion suggests against intra-articular extension to the Radiologist. No acute left femur or pelvic fracture is identified.   --Observation for pain relief.   --Fall precautions.   --Orthopedics consulted, plan for non operative management with NWB LLE with hinged knee brace locked in extension, can unlock for ROMAT when at rest. Follow up in 2 weeks for recheck at [facility]   --NWB LLE.    -- Tylenol for pain. Patient tried oxycodone and had significant nausea. Allergic to NSAIDS. She did not want to try another opioid. May re consider tramadol which she used after prior orthopedic surgery if nausea/light  "headedness improves  --PT  --SW consulted for disposition planning. Plan for TCU when medically stable     Nausea  Reports feeling nauseated and \"woozy\" at times. Zofran and compazine not effective and patient feels there are side effects. Very sensitive to medications. Reports episode of vertigo a few weeks back which feels somewhat similar though these symptoms much milder. She managed that with 7up and homeopathic supplement.   --sea bands  --try meclizine 7/25    Addendum: unable to tolerate anything po today and minimal UOP. Will give bolus, IVF afterwards. Continues to have significant nausea and dizziness. Reports she thinks she may have hit her head when she fell and has feels vision a little blurry compared to baseline. Will obtain non contrast head CT. No CP or SOB but will check trop x1.      Possible CAD: She has a remote history of positive stress test, and later in 2019 she had a Coronary CT that showed an overall score of 912, reportedly with most of the scoring found in the LAD territory and the aorta. Reportedly she has a history of allergy to aspirin which causes hives.   She has no chest pain no shortness of breath.      Hypertension: Resume home Norvasc 7/26, holding today due to dizziness      Glaucoma: Resume home eye drops           Observation Goals: -diagnostic tests and consults completed and resulted, -vital signs normal or at patient baseline, -tolerating oral intake to maintain hydration, -adequate pain control on oral analgesics, -returns to baseline functional status, -safe disposition plan has been identified, Nurse to notify provider when observation goals have been met and patient is ready for discharge.  Diet: Regular Diet Adult    DVT Prophylaxis: Enoxaparin (Lovenox) SQ  Maxwell Catheter: Not present  Lines: None     Cardiac Monitoring: None  Code Status: Full Code      Clinically Significant Risk Factors Present on Admission                              # Financial/Environmental " "Concerns: none         Disposition Plan     Medically Ready for Discharge: Anticipated Tomorrow           The patient's care was discussed with Dr. Coleman who agrees with the above plan     Radha Romero PA-C  Hospitalist Service  [facility]  Securely message with [employer] (more info)  Text page via [facility] Paging/Directory   ______________________________________________________________________    Interval History   Feeling a little better today in terms of nausea. Able to eat some toast and jello. No CP, SOB. Still feels \"woozy\" on and off and mildly nauseated. No abdominal pain.   Pain 6-7/10. Discussed pain medication options at length, patient does not want to try another opioid at this time.     Physical Exam   Temp: 98.2  F (36.8  C) Temp src: Oral BP: 139/75 Pulse: 90   Resp: 18 SpO2: 94 % O2 Device: None (Room air) Oxygen Delivery: 1.5 LPM  Vitals:    07/23/24 1724 07/24/24 0205   Weight: 52.2 kg (115 lb) 55.7 kg (122 lb 12.7 oz)     Vital Signs with Ranges  Temp:  [97.4  F (36.3  C)-98.2  F (36.8  C)] 98.2  F (36.8  C)  Pulse:  [90-95] 90  Resp:  [18] 18  BP: (139-152)/(72-83) 139/75  SpO2:  [93 %-97 %] 94 %  I/O last 3 completed shifts:  In: -   Out: 400 [Urine:400]    Constitutional: Alert and oriented, laying down in bed eating breakfast. Appears comfortable and is appropriately conversant   ENT: moist mucous membranes  Respiratory: Lungs clear to auscultation bilaterally, no increased work of breathing  Cardiovascular: Regular rhythm with mild tachycardia   GI: active bowel sounds, abdomen soft, non-tender  MSK:  LLE with brace. Can wiggle toes  Neuro:  speech is clear. Face symmetric. Sensation intact       Medical Decision Making       60 MINUTES SPENT BY ME on the date of service doing chart review, history, exam, documentation & further activities per the note.      Data         Imaging results reviewed over the past 24 hrs:   No results found for this or any previous " visit (from the past 24 hour(s)).

## 2024-07-25 NOTE — PROGRESS NOTES
Care Management Follow Up    Length of Stay (days): 0    Expected Discharge Date: 07/26/2024     Concerns to be Addressed: discharge planning     Patient plan of care discussed at interdisciplinary rounds: Yes    Anticipated Discharge Disposition: Home Care, Skilled Nursing Facility     Anticipated Discharge Services: Home Care  Anticipated Discharge DME:      Patient/family educated on Medicare website which has current facility and service quality ratings: other (see comments)  Education Provided on the Discharge Plan: Yes  Patient/Family in Agreement with the Plan: yes    Referrals Placed by CM/SW:    Private pay costs discussed: Not applicable    Additional Information:  Spoke with pt and spouse at bedside regarding TCU recommendation. Patient has not been to TCU before but is in agreement to go. Discussed TCU's that are in network with [facility] and also close to home. Pt agreeable to send referrals to the 3 facilities closest to home. Discussed insurance coverage and authorization needed. Pt understands it may take some time to get authorized once a place is found, and this could fall into the weekend. Sent referrals.    JOLANTA Anaya  Social Work  [facility]

## 2024-07-25 NOTE — PROGRESS NOTES
OBS GOALS:  -diagnostic tests and consults completed and resulted : not met  -vital signs normal or at patient baseline : partially met, 3L NC O2 at night  -tolerating oral intake to maintain hydration : No. On regular diet however intermittent nausea and vomiting persist.   -adequate pain control on oral analgesics : met  -returns to baseline functional status : Not met. Leg brace in place on LLE. NWB on LLE.   -safe disposition plan has been identified : not met  Nurse to notify provider when observation goals have been met and patient is ready for discharge.

## 2024-07-25 NOTE — PROGRESS NOTES
OBS GOALS:  -diagnostic tests and consults completed and resulted : not met  -vital signs normal or at patient baseline : met  -tolerating oral intake to maintain hydration : No. On regular diet however intermittent nausea and vomiting persist. Meclizine ineffective  -adequate pain control on oral analgesics : met  -returns to baseline functional status : Not met. Leg brace in place on LLE. NWB on LLE.   -safe disposition plan has been identified : not met  Nurse to notify provider when observation goals have been met and patient is ready for discharge.

## 2024-07-25 NOTE — PLAN OF CARE
OT: Order received, chart reviewed and discussed with care team. OT not indicated due to per chart review and discussion w/ treating physical therapist, pt is Ax2 at this time, currently observation status and not ambulating at this time, pt does have OT needs but these would be best met at next level of care. Pt is IND at baseline, currently Ax2 for bed mobility and basic transfers, limited by pain, WB status and dizziness. PT/OT goals would be overlapping at this time will defer IP therapy needs to PT while in hospital. Defer discharge recommendations to PT, current care team and next level of care as pt will require TCU before returning home. Will complete orders.

## 2024-07-25 NOTE — PROGRESS NOTES
PRIMARY Concern: L tibial fracture after fall  SAFETY RISK Concerns (fall risk, behaviors, etc.): Falls risk, Behavior: Green      Isolation/Type: None  Tests/Procedures for NEXT shift: Labs  Consults? (Pending/following, signed-off?) SW consulted, Orthopaedic Surgery Consulted  Where is patient from? (Home, TCU, etc.): Home w/   Other Important info for NEXT shift: Pt very anxious, has tremor pt states she had this PTA, afraid to take pain and nausea medications even after education.  Anticipated DC date & active delays: TBD  _____________________________________________________________________________  SUMMARY NOTE:  Orientation/Cognitive: A&Ox4  Observation Goals (Met/ Not Met): Not met  Mobility Level/Assist Equipment: A2 gb/w NWB LLE  Antibiotics & Plan (IV/po, length of tx left): None  Pain Management: Jacqui tylenol  Tele/VS/O2: VS ex tachy in 100s-110s  ABNL Lab/BG: See chart  Diet: Reg diet, not tolerating  Bowel/Bladder: Purewick, voiding  Skin Concerns: scattered bruising   Drains/Devices: Carmen TANNER  Patient Stated Goal for Today: Feel better

## 2024-07-25 NOTE — PLAN OF CARE
Goal Outcome Evaluation:  PRIMARY Concern: L tibial fracture post fall  SAFETY RISK Concerns (fall risk, behaviors, etc.): Falls risk, Behavior: Green      Isolation/Type: None  Tests/Procedures for NEXT shift: Labs, encourage OOB activity  Consults? (Pending/following, signed-off?) SW following, Orthopaedic Surgery Following  Where is patient from? (Home, TCU, etc.): Home w/   Other Important info for NEXT shift: Pt very anxious, has tremor pt states she had this PTA, afraid to take pain and nausea medications even after education.  Anticipated DC date & active delays: TBD  ____________________________________________________________________  SUMMARY NOTE:  Orientation/Cognitive: A&Ox4  Observation Goals (Met/ Not Met): Not met  Mobility Level/Assist Equipment: A2 gb/w NWB LLE  Antibiotics & Plan (IV/po, length of tx left): None  Pain Management: Jacqui tylenol  Tele/VS/O2: VS ex tachy at times on 2L NC O2 at night.   ABNL Lab/BG: See chart  Diet: Reg diet, not tolerating  Bowel/Bladder: Purewick, voiding  Skin Concerns: scattered bruising   Drains/Devices: PIV SL, Purewick  Patient Stated Goal for Today: Feel better       OBS GOALS:  -diagnostic tests and consults completed and resulted : not met  -vital signs normal or at patient baseline : partially met, 2L NC O2 at night  -tolerating oral intake to maintain hydration : No. On regular diet however intermittent nausea and vomiting persist.   -adequate pain control on oral analgesics : met  -returns to baseline functional status : Not met. Leg brace in place on LLE. NWB on LLE.   -safe disposition plan has been identified : not met  Nurse to notify provider when observation goals have been met and patient is ready for discharge

## 2024-07-25 NOTE — PROGRESS NOTES
OBS GOALS:  -diagnostic tests and consults completed and resulted : not met  -vital signs normal or at patient baseline : met  -tolerating oral intake to maintain hydration : No. On regular diet however intermittent nausea and vomiting persist.   -adequate pain control on oral analgesics : met  -returns to baseline functional status : Not met. Leg brace in place on LLE. NWB on LLE.   -safe disposition plan has been identified : not met  Nurse to notify provider when observation goals have been met and patient is ready for discharge.

## 2024-07-25 NOTE — PROGRESS NOTES
Top portion must ALWAYS be completed  PRIMARY Concern: fall, L tibial fx  SAFETY RISK Concerns (fall risk, behaviors, etc.): fall risk      Isolation/Type: none  Tests/Procedures for NEXT shift: none  Consults? (Pending/following, signed-off?) ortho  Where is patient from? (Home, TCU, etc.): home  Other Important info for NEXT shift: May start on lovenox for DVT prevention. Tremors.  Anticipated DC date & active delays: TBD pending TCU  _____________________________________________________________________________  SUMMARY NOTE:              (either paste note here OR complete the info below- RN to choose one- delete info below if not used)  Orientation/Cognitive: a/ox4  Observation Goals (Met/ Not Met): not met  Mobility Level/Assist Equipment: A2 gb w  Antibiotics & Plan (IV/po, length of tx left): none  Pain Management: tyl scheduled  Tele/VS/O2: VSS RA  ABNL Lab/BG: WBC 12.1  Diet: Reg  Bowel/Bladder: p wick  Skin Concerns: bruising  Drains/Devices: LLE brace  Patient Stated Goal for Today: rest, nausea management

## 2024-07-25 NOTE — PROGRESS NOTES
7/25/24: 1500-1930hr  PRIMARY Concern: L tibial fracture post fall  SAFETY RISK Concerns (fall risk, behaviors, etc.): Falls risk, Behavior: Green      Isolation/Type: None  Tests/Procedures for NEXT shift: UA sample and CT head for dizziness/possible head trauma.   Consults? (Pending/following, signed-off?) SW/Ortho following  Where is patient from? (Home, TCU, etc.): Home w/   Other Important info for NEXT shift: Baseline tremors afraid to take pain and nausea medications even after education.  Anticipated DC date & active delays: TBD, pending nausea/pain management and Diet tolerance    ___________________________________________  SUMMARY NOTE:  Orientation/Cognitive: A&Ox4  Observation Goals (Met/ Not Met): Not met  Mobility Level/Assist Equipment: A2 gb/w NWB LLE. Locked hinged brace in use.   Antibiotics & Plan (IV/po, length of tx left): None  Pain Management: Jacqui tylenol. Reluctant to take pain meds.   Tele/VS/O2: Tachy at times, other VSS on RA.   ABNL Lab/BG: Plt 132,   Diet: Reg diet, not tolerating diet.   Bowel/Bladder: Purewick. Poor UOP. NS 500ml bolus givenx1. Will continue IVF 100ml/hr.   Skin Concerns: Scattered bruising   Drains/Devices: PIV SL, Purewick  Patient Stated Goal for Today: Able to eat.

## 2024-07-26 ENCOUNTER — APPOINTMENT (OUTPATIENT)
Dept: ULTRASOUND IMAGING | Facility: CLINIC | Age: 79
DRG: 562 | End: 2024-07-26
Attending: PHYSICIAN ASSISTANT
Payer: MEDICARE

## 2024-07-26 PROBLEM — S82.202A LEFT TIBIAL FRACTURE: Status: ACTIVE | Noted: 2024-07-26

## 2024-07-26 LAB
ALBUMIN SERPL BCG-MCNC: 3.5 G/DL (ref 3.5–5.2)
ALP SERPL-CCNC: 83 U/L (ref 40–150)
ALT SERPL W P-5'-P-CCNC: 25 U/L (ref 0–50)
ANION GAP SERPL CALCULATED.3IONS-SCNC: 9 MMOL/L (ref 7–15)
AST SERPL W P-5'-P-CCNC: 38 U/L (ref 0–45)
BILIRUB DIRECT SERPL-MCNC: <0.2 MG/DL (ref 0–0.3)
BILIRUB SERPL-MCNC: 0.5 MG/DL
BUN SERPL-MCNC: 8.1 MG/DL (ref 8–23)
CALCIUM SERPL-MCNC: 8.5 MG/DL (ref 8.8–10.4)
CHLORIDE SERPL-SCNC: 109 MMOL/L (ref 98–107)
CREAT SERPL-MCNC: 0.58 MG/DL (ref 0.51–0.95)
EGFRCR SERPLBLD CKD-EPI 2021: >90 ML/MIN/1.73M2
ERYTHROCYTE [DISTWIDTH] IN BLOOD BY AUTOMATED COUNT: 13.7 % (ref 10–15)
ERYTHROCYTE [DISTWIDTH] IN BLOOD BY AUTOMATED COUNT: 13.8 % (ref 10–15)
GLUCOSE SERPL-MCNC: 95 MG/DL (ref 70–99)
HCO3 SERPL-SCNC: 24 MMOL/L (ref 22–29)
HCT VFR BLD AUTO: 43.3 % (ref 35–47)
HCT VFR BLD AUTO: 43.9 % (ref 35–47)
HGB BLD-MCNC: 13.9 G/DL (ref 11.7–15.7)
HGB BLD-MCNC: 14.3 G/DL (ref 11.7–15.7)
MCH RBC QN AUTO: 31.7 PG (ref 26.5–33)
MCH RBC QN AUTO: 32.1 PG (ref 26.5–33)
MCHC RBC AUTO-ENTMCNC: 32.1 G/DL (ref 31.5–36.5)
MCHC RBC AUTO-ENTMCNC: 32.6 G/DL (ref 31.5–36.5)
MCV RBC AUTO: 98 FL (ref 78–100)
MCV RBC AUTO: 99 FL (ref 78–100)
PLATELET # BLD AUTO: 125 10E3/UL (ref 150–450)
PLATELET # BLD AUTO: 125 10E3/UL (ref 150–450)
PLATELET # BLD AUTO: 136 10E3/UL (ref 150–450)
POTASSIUM SERPL-SCNC: 3.3 MMOL/L (ref 3.4–5.3)
POTASSIUM SERPL-SCNC: 3.3 MMOL/L (ref 3.4–5.3)
PROT SERPL-MCNC: 6.4 G/DL (ref 6.4–8.3)
RBC # BLD AUTO: 4.39 10E6/UL (ref 3.8–5.2)
RBC # BLD AUTO: 4.46 10E6/UL (ref 3.8–5.2)
SODIUM SERPL-SCNC: 142 MMOL/L (ref 135–145)
WBC # BLD AUTO: 7 10E3/UL (ref 4–11)
WBC # BLD AUTO: 7.3 10E3/UL (ref 4–11)

## 2024-07-26 PROCEDURE — 99233 SBSQ HOSP IP/OBS HIGH 50: CPT | Performed by: STUDENT IN AN ORGANIZED HEALTH CARE EDUCATION/TRAINING PROGRAM

## 2024-07-26 PROCEDURE — 999N000128 HC STATISTIC PERIPHERAL IV START W/O US GUIDANCE

## 2024-07-26 PROCEDURE — G0378 HOSPITAL OBSERVATION PER HR: HCPCS

## 2024-07-26 PROCEDURE — 82374 ASSAY BLOOD CARBON DIOXIDE: CPT | Performed by: PHYSICIAN ASSISTANT

## 2024-07-26 PROCEDURE — 250N000012 HC RX MED GY IP 250 OP 636 PS 637: Performed by: PHYSICIAN ASSISTANT

## 2024-07-26 PROCEDURE — 82248 BILIRUBIN DIRECT: CPT | Performed by: PHYSICIAN ASSISTANT

## 2024-07-26 PROCEDURE — 96361 HYDRATE IV INFUSION ADD-ON: CPT

## 2024-07-26 PROCEDURE — 85520 HEPARIN ASSAY: CPT | Performed by: PHYSICIAN ASSISTANT

## 2024-07-26 PROCEDURE — 85027 COMPLETE CBC AUTOMATED: CPT | Performed by: PHYSICIAN ASSISTANT

## 2024-07-26 PROCEDURE — 120N000001 HC R&B MED SURG/OB

## 2024-07-26 PROCEDURE — 250N000013 HC RX MED GY IP 250 OP 250 PS 637: Performed by: PHYSICIAN ASSISTANT

## 2024-07-26 PROCEDURE — 84132 ASSAY OF SERUM POTASSIUM: CPT | Performed by: PHYSICIAN ASSISTANT

## 2024-07-26 PROCEDURE — 93970 EXTREMITY STUDY: CPT

## 2024-07-26 PROCEDURE — 99418 PROLNG IP/OBS E/M EA 15 MIN: CPT | Performed by: STUDENT IN AN ORGANIZED HEALTH CARE EDUCATION/TRAINING PROGRAM

## 2024-07-26 PROCEDURE — 250N000013 HC RX MED GY IP 250 OP 250 PS 637: Performed by: STUDENT IN AN ORGANIZED HEALTH CARE EDUCATION/TRAINING PROGRAM

## 2024-07-26 PROCEDURE — 99207 PR APP CREDIT; MD BILLING SHARED VISIT: CPT | Performed by: PHYSICIAN ASSISTANT

## 2024-07-26 PROCEDURE — 85049 AUTOMATED PLATELET COUNT: CPT | Performed by: PHYSICIAN ASSISTANT

## 2024-07-26 PROCEDURE — 36415 COLL VENOUS BLD VENIPUNCTURE: CPT | Performed by: PHYSICIAN ASSISTANT

## 2024-07-26 PROCEDURE — 250N000011 HC RX IP 250 OP 636: Performed by: PHYSICIAN ASSISTANT

## 2024-07-26 RX ORDER — METHYLPREDNISOLONE 16 MG/1
32 TABLET ORAL ONCE
Status: COMPLETED | OUTPATIENT
Start: 2024-07-26 | End: 2024-07-26

## 2024-07-26 RX ORDER — DIPHENHYDRAMINE HYDROCHLORIDE 50 MG/ML
25 INJECTION INTRAMUSCULAR; INTRAVENOUS EVERY 6 HOURS PRN
Status: DISCONTINUED | OUTPATIENT
Start: 2024-07-26 | End: 2024-07-30 | Stop reason: HOSPADM

## 2024-07-26 RX ORDER — METHYLPREDNISOLONE 16 MG/1
32 TABLET ORAL ONCE
Status: COMPLETED | OUTPATIENT
Start: 2024-07-26 | End: 2024-07-27

## 2024-07-26 RX ORDER — HEPARIN SODIUM 10000 [USP'U]/100ML
0-5000 INJECTION, SOLUTION INTRAVENOUS CONTINUOUS
Status: DISCONTINUED | OUTPATIENT
Start: 2024-07-26 | End: 2024-07-28

## 2024-07-26 RX ORDER — POTASSIUM CHLORIDE 1500 MG/1
40 TABLET, EXTENDED RELEASE ORAL ONCE
Status: COMPLETED | OUTPATIENT
Start: 2024-07-26 | End: 2024-07-26

## 2024-07-26 RX ORDER — DIPHENHYDRAMINE HCL 25 MG
25 CAPSULE ORAL EVERY 6 HOURS PRN
Status: DISCONTINUED | OUTPATIENT
Start: 2024-07-26 | End: 2024-07-30 | Stop reason: HOSPADM

## 2024-07-26 RX ORDER — DIPHENHYDRAMINE HCL 25 MG
50 CAPSULE ORAL ONCE
Status: DISCONTINUED | OUTPATIENT
Start: 2024-07-26 | End: 2024-07-29

## 2024-07-26 RX ORDER — LIDOCAINE 40 MG/G
CREAM TOPICAL
Status: DISCONTINUED | OUTPATIENT
Start: 2024-07-26 | End: 2024-07-30 | Stop reason: HOSPADM

## 2024-07-26 RX ADMIN — HEPARIN SODIUM 1000 UNITS/HR: 10000 INJECTION, SOLUTION INTRAVENOUS at 16:17

## 2024-07-26 RX ADMIN — ACETAMINOPHEN 650 MG: 325 TABLET, FILM COATED ORAL at 03:27

## 2024-07-26 RX ADMIN — BRIMONIDINE TARTRATE 2 DROP: 2 SOLUTION/ DROPS OPHTHALMIC at 20:06

## 2024-07-26 RX ADMIN — CARBOXYMETHYLCELLULOSE SODIUM 1 DROP: 5 SOLUTION/ DROPS OPHTHALMIC at 20:06

## 2024-07-26 RX ADMIN — METHYLPREDNISOLONE 32 MG: 16 TABLET ORAL at 16:07

## 2024-07-26 RX ADMIN — LATANOPROST 1 DROP: 50 SOLUTION OPHTHALMIC at 21:25

## 2024-07-26 RX ADMIN — ACETAMINOPHEN 650 MG: 325 TABLET, FILM COATED ORAL at 09:20

## 2024-07-26 RX ADMIN — CARBOXYMETHYLCELLULOSE SODIUM 1 DROP: 5 SOLUTION/ DROPS OPHTHALMIC at 09:22

## 2024-07-26 RX ADMIN — ACETAMINOPHEN 650 MG: 325 TABLET, FILM COATED ORAL at 21:20

## 2024-07-26 RX ADMIN — BRIMONIDINE TARTRATE 2 DROP: 2 SOLUTION/ DROPS OPHTHALMIC at 09:24

## 2024-07-26 ASSESSMENT — ACTIVITIES OF DAILY LIVING (ADL)
ADLS_ACUITY_SCORE: 49
ADLS_ACUITY_SCORE: 53
ADLS_ACUITY_SCORE: 51
ADLS_ACUITY_SCORE: 53
ADLS_ACUITY_SCORE: 48
ADLS_ACUITY_SCORE: 53
ADLS_ACUITY_SCORE: 51
ADLS_ACUITY_SCORE: 53
ADLS_ACUITY_SCORE: 49
ADLS_ACUITY_SCORE: 51
ADLS_ACUITY_SCORE: 48
ADLS_ACUITY_SCORE: 53
ADLS_ACUITY_SCORE: 51
ADLS_ACUITY_SCORE: 51
ADLS_ACUITY_SCORE: 53
ADLS_ACUITY_SCORE: 51
ADLS_ACUITY_SCORE: 49
ADLS_ACUITY_SCORE: 49

## 2024-07-26 NOTE — CONSULTS
Patient has Medicare D through [employer] sponsored by an employer.    Xarelto/Eliquis:  $52/mo.    Erica Ng  Pharmacy Technician/Liaison, Discharge Pharmacy   [phone] (voice or text)  [email or web address]

## 2024-07-26 NOTE — PLAN OF CARE
Goal Outcome Evaluation:           PRIMARY Concern: L tibial fx, nausea  SAFETY RISK Concerns (fall risk, behaviors, etc.): Falls, behavior: green      Isolation/Type: none  Tests/Procedures for NEXT shift: UC pending. RBC Morphology levels. Chest CT.   Consults? (Pending/following, signed-off?) SW followng.  PT following (recs TCU)  Where is patient from? (Home, TCU, etc.): home w/   Other Important info for NEXT shift: Reluctant to take pain meds.  OK with doing Tylenol and ice packs.   Anticipated DC date & active delays: TBD pending improvement  ___________________________________________  SUMMARY NOTE:  Orientation/Cognitive: A&Ox4  Observation Goals (Met/ Not Met): Not met  Mobility Level/Assist Equipment: A2 Gb/W; NWB LLE, L brace on  Antibiotics & Plan (IV/po, length of tx left): none.  Pain Management:  tylenol.   Tele/VS/O2: VSS RA  ABNL Lab/BG: See chart  Diet: Regular.  No emesis today.   Bowel/Bladder: Incontinent at times.   Skin Concerns: bruising--right elbow and left knee.   Drains/Devices: LLE brace, IV inf NS @ 100 mL/hr  Patient Stated Goal for Today: Rest            Summary--Bilateral US showed a DVT in the right LE.  Patient to start on hepparin gtt.  Will need to be pre-medicated for the CT of the chest. Patient over-all, feeling better today.  Discharge to TCU pending progress.  The SW is communicating with the spouse to pick a place.

## 2024-07-26 NOTE — PROGRESS NOTES
Care Management Follow Up    Length of Stay (days): 0    Expected Discharge Date: 07/27/2024     Concerns to be Addressed: discharge planning     Patient plan of care discussed at interdisciplinary rounds: Yes    Anticipated Discharge Disposition: TCU     Anticipated Discharge Services:   Anticipated Discharge DME:      Patient/family educated on Medicare website which has current facility and service quality ratings: other (see comments)  Education Provided on the Discharge Plan: Yes  Patient/Family in Agreement with the Plan: yes    Referrals Placed by CM/SW:    Private pay costs discussed: Not applicable    Additional Information:  Patient has been accepted to [facility] for Monday 7/29. SW to update patient later today.    Addendum 1445: Spoke with pt to update on acceptance at [place] for Monday. Pt agreeable, stating they would prefer [facility] if possible since it is where pt lives. Informed SW did send referral but have not heard back. Discussed transport options again, as well as private vs. shared rooms, pt to decide on this once knowing what's available. Answered questions related to TCU. Called [facility] to check on status of referral, left VM for admissions.     JOLANTA Anaya  Social Work  [facility]

## 2024-07-26 NOTE — PROGRESS NOTES
MD Notification    Notified Person: MD    Notified Person Name: Dr Ortiz Antonyscarlettloki     Notification Date/Time: 7/25/24 @ 1930hr    Notification Interaction: Vocera    Purpose of Notification: Pt requiring oxygen @ 2L nc d/t SOB.     Orders Received:    Comments:

## 2024-07-26 NOTE — PROGRESS NOTES
diagnostic tests and consults completed and resulted : not met, CT head pending  -vital signs normal or at patient baseline : met, on 2L NC  -tolerating oral intake to maintain hydration : No. On regular diet however intermittent nausea and vomiting persist. Meclizine PRN available and given  -adequate pain control on oral analgesics : met  -returns to baseline functional status : Not met. Leg brace in place on LLE. NWB on LLE.   -safe disposition plan has been identified : not met pending TCU placement  Nurse to notify provider when observation goals have been met and patient is ready for discharge.

## 2024-07-26 NOTE — PROGRESS NOTES
PRIMARY Concern: L tibial fracture post fall  SAFETY RISK Concerns (fall risk, behaviors, etc.): Falls risk, Behavior: Green      Isolation/Type: None  Tests/Procedures for NEXT shift: UA +, UC pending and CT head for dizziness/possible head trauma.   Consults? (Pending/following, signed-off?) SW/Ortho following, PT recommending TCU  Where is patient from? (Home, TCU, etc.): Home w/   Other Important info for NEXT shift: None  Anticipated DC date & active delays: TBD, pending nausea/pain management and Diet tolerance    ___________________________________________  SUMMARY NOTE:  Orientation/Cognitive: A&Ox4  Observation Goals (Met/ Not Met): Not met  Mobility Level/Assist Equipment: A2 gb/w NWB LLE. Locked hinged brace in use.   Antibiotics & Plan (IV/po, length of tx left): None  Pain Management: tylenol for L knee pain  Tele/VS/O2: VS on 2L  NC, CXR negative today  ABNL Lab/BG: Plt 132,   Diet: Reg diet, not tolerating diet.   Bowel/Bladder: Purewick.  Incontinent with good UO  Skin Concerns: Scattered bruising   Drains/Devices: PIV infusing, Purewick in place  Patient Stated Goal for Today: Nausea control and improved appetite

## 2024-07-26 NOTE — PROGRESS NOTES
OBS GOALS:  diagnostic tests and consults completed and resulted : Partially met.  Patient scheduled to have a bilateral LE ultrasound today.   -vital signs normal or at patient baseline : Met at this time.  Spot checking.  92% on RA.   -tolerating oral intake to maintain hydration :met at this time . Regular diet. Continuing to assess.  -adequate pain control on oral analgesics : met.  Tylenol effective for pain control.  Requesting nothing stronger.   -returns to baseline functional status : Not met. Leg brace in place on LLE. NWB on LLE.   -safe disposition plan has been identified : not met pending TCU placement. SW following. SW has updates on patient's insurance plan.

## 2024-07-26 NOTE — PROGRESS NOTES
[facility]    Medicine Progress Note - Hospitalist Service    Date of Admission:  7/23/2024    Assessment & Plan   Lacie Choi is a markedly pleasant 78 year old woman with past medical history that is most notable for osteoporosis, as well as hypertension, hyperlipidemia, and elevated coronary artery calcium score, among others; who presents with mechanical fall and is found to have acute proximal tibial metaphysis fracture.    ADDENDUM:   US shows RLE DVT.   Plan to start patient on heparin, anticipate DOAC transition tomorrow  Pharmacy liaison consult  Obtain CT PE to eval for PE given intermittent hypoxia, tachycardia. Will need pre treatment given contrast allergy  Admit inpatient      Acute proximal tibial metaphysis fracture: Of note, Ms. Choi has osteoporosis and is prescribed Boniva. In 2022 she underwent left ankle arthroscopy, with treatment of osteochondral injury talus, lateral ligament reconstruction, and treatment of calcaneonavicular coalition. She also reports surgical repair of her left shoulder rotator cuff last year.   Now, she presents after a mechanical fall tonight. In the ED, she has tachycardia, without hypotension or fever. She has brief hypoxia after administration of narcotics that has resolved now. X-rays of left knee, femur, tibia and fibula and pelvis are limited; CT of pelvis, left knee and left femur reveal an acute, nondisplaced fracture of the proximal, medial tibial metaphysis. This demonstrates equivocal intra-articular extension to the medial tibial plateau, although the lack of a significant joint effusion suggests against intra-articular extension to the Radiologist. No acute left femur or pelvic fracture is identified.   --Observation for pain relief.   --Fall precautions.   --Orthopedics consulted, plan for non operative management with NWB LLE with hinged knee brace locked in extension, can unlock for ROMAT when at rest. Follow up in  "2 weeks for recheck at [facility]   --NWASHLIE LLE.    -- Tylenol for pain. Patient tried oxycodone and had significant nausea. Allergic to NSAIDS. She did not want to try another opioid. May re consider tramadol which she used after prior orthopedic surgery if nausea/light headedness improves  --PT- recommending TCU  --SW consulted for disposition planning. Plan for TCU when medically stable     Nausea  Reports feeling nauseated and \"woozy\" at times. Zofran and compazine not effective and patient feels there are side effects. Very sensitive to medications and reluctant to try other options due to side effects. Reports episode of vertigo a few weeks back which feels somewhat similar though these symptoms much milder. She managed that with 7up and homeopathic supplement.   7/25 unable to tolerate much po intake due to nausea. Also with low UOP. Started on IVF.   Able to tolerate some po 7/26 with improved nausea  *trop neg, no CP, SOB  --meclizine added 7/25  --prn ativan available, pt reluctant to try    Acute hypoxic respiratory failure  Desat to 85% evening of 7/25. Pt denies SOB. May have some atelectasis as she has not been moving much   CXR neg for acute findings  Back on room air 7/26 thought some intermittent tachycardia  --encouarge IS   --check LE US to eval for DVT    Hypokalemia  K 3.3. declines K supplement. Will try and eat more today and recheck in am     Abnormal UA  UA checked due to ongoing nausea. Positive for nitrites and few bacteria otherwise WNL.   Asymptomatic  --follow UC off abx for now      Possible CAD: She has a remote history of positive stress test, and later in 2019 she had a Coronary CT that showed an overall score of 912, reportedly with most of the scoring found in the LAD territory and the aorta. Reportedly she has a history of allergy to aspirin which causes hives.   She has no chest pain no shortness of breath.   Trop neg      Hypertension: Resume home Norvasc as needed, holding today due to " dizziness      Glaucoma: Resume home eye drops           Observation Goals: -diagnostic tests and consults completed and resulted, -vital signs normal or at patient baseline, -tolerating oral intake to maintain hydration, -adequate pain control on oral analgesics, -returns to baseline functional status, -safe disposition plan has been identified, Nurse to notify provider when observation goals have been met and patient is ready for discharge.  Diet: Regular Diet Adult    DVT Prophylaxis: Enoxaparin (Lovenox) SQ  Maxwell Catheter: Not present  Lines: None     Cardiac Monitoring: None  Code Status: Full Code      Clinically Significant Risk Factors Present on Admission                # Thrombocytopenia: Lowest platelets = 125 in last 2 days, will monitor for bleeding               # Financial/Environmental Concerns: none         Disposition Plan     Medically Ready for Discharge: anticipated tomorrow            The patient's care was discussed with Dr. Coleman who agrees with the above plan     Radha Romero PA-C  Hospitalist Service  [facility]  Securely message with [email or web address] (more info)  Text page via [facility] Paging/Directory   ______________________________________________________________________    Interval History   Feeling better this am. Able to eat breakfast. Nausea much less. Dizziness is milder today. Felt SOB last evening, improved today. No chest pain.   Pain 4-5. She does not want to try anything else for pain at this time.     Plan to update     Physical Exam   Temp: 98.4  F (36.9  C) Temp src: Oral BP: 135/75 Pulse: 84   Resp: 18 SpO2: 92 % O2 Device: None (Room air) Oxygen Delivery: 2 LPM  Vitals:    07/23/24 1724 07/24/24 0205   Weight: 52.2 kg (115 lb) 55.7 kg (122 lb 12.7 oz)     Vital Signs with Ranges  Temp:  [98.1  F (36.7  C)-99.9  F (37.7  C)] 98.4  F (36.9  C)  Pulse:  [] 84  Resp:  [17-18] 18  BP: (135-156)/(67-86) 135/75  SpO2:  [85 %-97 %] 92 %  I/O  last 3 completed shifts:  In: -   Out: 1100 [Urine:1100]    Constitutional: Alert and oriented, laying down in bed eating breakfast. Appears comfortable and is appropriately conversant   ENT: moist mucous membranes  Respiratory: Lungs clear to auscultation bilaterally, no increased work of breathing  Cardiovascular: Regular rhythm with mild tachycardia   GI: active bowel sounds, abdomen soft, non-tender  MSK:  LLE with brace. Can wiggle toes. R calf soft, non tender   Neuro:  speech is clear. Face symmetric. Sensation intact    Medical Decision Making       75 MINUTES SPENT BY ME on the date of service doing chart review, history, exam, documentation & further activities per the note.      Data     I have personally reviewed the following data over the past 24 hrs:    7.3  \   13.9   / 125 (L); 125 (L)     142 109 (H) 8.1 /  95   3.3 (L) 24 0.58 \     ALT: 25 AST: 38 AP: 83 TBILI: 0.5   ALB: 3.5 TOT PROTEIN: 6.4 LIPASE: N/A     Trop: 14 BNP: N/A       Imaging results reviewed over the past 24 hrs:   Recent Results (from the past 24 hour(s))   XR Chest Port 1 View    Narrative    EXAM: XR CHEST PORT 1 VIEW  LOCATION: [facility]  DATE: 7/25/2024    INDICATION: acute hypoxia  COMPARISON: None.      Impression    IMPRESSION: No acute cardiopulmonary abnormalities identified. Partially calcified thoracic aortic arch. Scattered hypertrophic changes of the spine.       CT Head w/o Contrast    Narrative    EXAM: CT HEAD W/O CONTRAST  LOCATION: [facility]  DATE: 7/25/2024    INDICATION: Dizziness, nausea, possible head trauma.  COMPARISON: None.  TECHNIQUE: Routine CT Head without IV contrast. Multiplanar reformats. Dose reduction techniques were used.    FINDINGS:  The superior most aspect of the skull is not included in the study.  INTRACRANIAL CONTENTS: No intracranial hemorrhage, extraaxial collection, or mass effect.  No CT evidence of acute infarct. Mild presumed  chronic small vessel ischemic changes. Mild generalized volume loss. No hydrocephalus.     VISUALIZED ORBITS/SINUSES/MASTOIDS: No intraorbital abnormality. No paranasal sinus mucosal disease. No middle ear or mastoid effusion.    BONES/SOFT TISSUES: No acute abnormality.      Impression    IMPRESSION:  1.  No acute intracranial abnormality.  2.  The study does not include the uppermost aspect of the vertex of the skull. Please correlate clinically and if warranted, the patient can be brought back for additional imaging.

## 2024-07-26 NOTE — SIGNIFICANT EVENT
Significant Event Note    Time of event: 7:35 PM July 25, 2024    Description of event:  Dyspnea, hypoxia    Plan:  Oxygen  Duonebs  CXR    Angel To MD

## 2024-07-26 NOTE — PLAN OF CARE
Goal Outcome Evaluation:    PRIMARY Concern: L tibial fx, nausea  SAFETY RISK Concerns (fall risk, behaviors, etc.): Falls, behavior: green      Isolation/Type: none  Tests/Procedures for NEXT shift: UC pending  Consults? (Pending/following, signed-off?) SW/Ortho following, PT following (recs TCU)  Where is patient from? (Home, TCU, etc.): home w/   Other Important info for NEXT shift: Reluctant to take pain meds  Anticipated DC date & active delays: TBD pending improvement  ___________________________________________  SUMMARY NOTE:  Orientation/Cognitive: A&Ox4  Observation Goals (Met/ Not Met): Not met  Mobility Level/Assist Equipment: A2 Gb/W; NWB LLE, L brace on  Antibiotics & Plan (IV/po, length of tx left): none  Pain Management: Jacqui tyl  Tele/VS/O2: VSS RA  ABNL Lab/BG: See chart  Diet: Reg, not tolerating well  Bowel/Bladder: Purewick, voiding adequately  Skin Concerns: bruising  Drains/Devices: LLE brace, IV inf NS @ 100 mL/hr  Patient Stated Goal for Today: Rest           OBS GOALS:  diagnostic tests and consults completed and resulted : met  -vital signs normal or at patient baseline : not met, on 2L NC  -tolerating oral intake to maintain hydration : Not met. On regular diet however intermittent nausea and vomiting persist. Meclizine PRN available and given  -adequate pain control on oral analgesics : met  -returns to baseline functional status : Not met. Leg brace in place on LLE. NWB on LLE.   -safe disposition plan has been identified : not met pending TCU placement  Nurse to notify provider when observation goals have been met and patient is ready for discharge

## 2024-07-26 NOTE — PROGRESS NOTES
OBS GOALS:  diagnostic tests and consults completed and resulted : met at this time.   -vital signs normal or at patient baseline : not met, on 2L NC.  Will attempt to wean.   -tolerating oral intake to maintain hydration :Partially met. On regular diet; eating breakfast this a.m. Continuing to assess . Meclizine PRN available if needed.   -adequate pain control on oral analgesics : met.  Tylenol effective for pain control.  Requesting nothing stronger.   -returns to baseline functional status : Not met. Leg brace in place on LLE. NWB on LLE.   -safe disposition plan has been identified : not met pending TCU placement. SW following.

## 2024-07-26 NOTE — UTILIZATION REVIEW
Admission Status; Secondary Review Determination       Under the authority of the Utilization Management Committee, the utilization review process indicated a secondary review on the above patient. The review outcome is based on review of the medical records, discussions with staff, and applying clinical experience noted on the date of the review.     (x) Inpatient Status Appropriate - This patient's medical care is consistent with medical management for inpatient care and reasonable inpatient medical practice.     RATIONALE FOR DETERMINATION     78 year old female with pmh of osteoporosis, as well as hypertension, hyperlipidemia, and elevated coronary artery calcium score, among others; who presents with mechanical fall and is found to have acute proximal tibial metaphysis fracture after fall. Orthopedics consulted and recommended nonoperative symptomatic care. Unfortunately her course has been complicated by nausea and vomiting, treatment which has been further hampered by numerous medication allergies and declination of using certain anti-emetics for the patient. This culminated in her starting on IVF on 7/25 due to inability to tolerate anything more than a half piece of toast per the physician assistant. She also was found to be hypoxic requiring 2 liters of oxygen when she desaturated to 85 % last evening at 7 pm. She was not sleeping. The primary team is working that up further. Given persistent symptoms that are uncontrolled outside an observation period would advance to inpatient status.    Patient requires inpatient admission versus short stay observation or outpatient treatment for the following reasons:      The expected length of stay at the time of admission was more than 2 nights because of the severity of illness, intensity of service provided, and risk for adverse outcome. Inpatient admission is appropriate.         This document was produced using voice recognition software       The information  on this document is developed by the utilization review team in order for the business office to ensure compliance. This only denotes the appropriateness of proper admission status and does not reflect the quality of care rendered.   The definitions of Inpatient Status and Observation Status used in making the determination above are those provided in the CMS Coverage Manual, Chapter 1 and Chapter 6, section 70.4.   Sincerely,   Caden Aaron DO  Physician Advisor  [facility].

## 2024-07-26 NOTE — PROGRESS NOTES
OBS GOALS:  diagnostic tests and consults completed and resulted : met  -vital signs normal or at patient baseline : not met, on 2L NC  -tolerating oral intake to maintain hydration : Not met. On regular diet however intermittent nausea and vomiting persist. Meclizine PRN available and given  -adequate pain control on oral analgesics : met  -returns to baseline functional status : Not met. Leg brace in place on LLE. NWB on LLE.   -safe disposition plan has been identified : not met pending TCU placement  Nurse to notify provider when observation goals have been met and patient is ready for discharge.

## 2024-07-27 ENCOUNTER — APPOINTMENT (OUTPATIENT)
Dept: CT IMAGING | Facility: CLINIC | Age: 79
DRG: 562 | End: 2024-07-27
Attending: PHYSICIAN ASSISTANT
Payer: MEDICARE

## 2024-07-27 LAB
ANION GAP SERPL CALCULATED.3IONS-SCNC: 11 MMOL/L (ref 7–15)
BACTERIA UR CULT: NORMAL
BUN SERPL-MCNC: 16.7 MG/DL (ref 8–23)
CALCIUM SERPL-MCNC: 9.1 MG/DL (ref 8.8–10.4)
CHLORIDE SERPL-SCNC: 107 MMOL/L (ref 98–107)
CREAT SERPL-MCNC: 0.52 MG/DL (ref 0.51–0.95)
EGFRCR SERPLBLD CKD-EPI 2021: >90 ML/MIN/1.73M2
GLUCOSE SERPL-MCNC: 148 MG/DL (ref 70–99)
HCO3 SERPL-SCNC: 23 MMOL/L (ref 22–29)
POTASSIUM SERPL-SCNC: 3.1 MMOL/L (ref 3.4–5.3)
POTASSIUM SERPL-SCNC: 3.5 MMOL/L (ref 3.4–5.3)
SODIUM SERPL-SCNC: 141 MMOL/L (ref 135–145)
UFH PPP CHRO-ACNC: 0.35 IU/ML
UFH PPP CHRO-ACNC: 0.93 IU/ML
UFH PPP CHRO-ACNC: >1.1 IU/ML

## 2024-07-27 PROCEDURE — 84132 ASSAY OF SERUM POTASSIUM: CPT | Performed by: INTERNAL MEDICINE

## 2024-07-27 PROCEDURE — 250N000011 HC RX IP 250 OP 636: Performed by: STUDENT IN AN ORGANIZED HEALTH CARE EDUCATION/TRAINING PROGRAM

## 2024-07-27 PROCEDURE — 80048 BASIC METABOLIC PNL TOTAL CA: CPT | Performed by: PHYSICIAN ASSISTANT

## 2024-07-27 PROCEDURE — 250N000013 HC RX MED GY IP 250 OP 250 PS 637: Performed by: INTERNAL MEDICINE

## 2024-07-27 PROCEDURE — 250N000013 HC RX MED GY IP 250 OP 250 PS 637: Performed by: STUDENT IN AN ORGANIZED HEALTH CARE EDUCATION/TRAINING PROGRAM

## 2024-07-27 PROCEDURE — 36415 COLL VENOUS BLD VENIPUNCTURE: CPT | Performed by: INTERNAL MEDICINE

## 2024-07-27 PROCEDURE — 71275 CT ANGIOGRAPHY CHEST: CPT | Mod: MF

## 2024-07-27 PROCEDURE — 250N000009 HC RX 250: Performed by: STUDENT IN AN ORGANIZED HEALTH CARE EDUCATION/TRAINING PROGRAM

## 2024-07-27 PROCEDURE — 250N000013 HC RX MED GY IP 250 OP 250 PS 637: Performed by: PHYSICIAN ASSISTANT

## 2024-07-27 PROCEDURE — 120N000001 HC R&B MED SURG/OB

## 2024-07-27 PROCEDURE — 99232 SBSQ HOSP IP/OBS MODERATE 35: CPT | Performed by: INTERNAL MEDICINE

## 2024-07-27 PROCEDURE — 250N000013 HC RX MED GY IP 250 OP 250 PS 637: Performed by: HOSPITALIST

## 2024-07-27 PROCEDURE — 85025 COMPLETE CBC W/AUTO DIFF WBC: CPT | Performed by: INTERNAL MEDICINE

## 2024-07-27 PROCEDURE — 36415 COLL VENOUS BLD VENIPUNCTURE: CPT | Performed by: STUDENT IN AN ORGANIZED HEALTH CARE EDUCATION/TRAINING PROGRAM

## 2024-07-27 PROCEDURE — 85520 HEPARIN ASSAY: CPT | Performed by: STUDENT IN AN ORGANIZED HEALTH CARE EDUCATION/TRAINING PROGRAM

## 2024-07-27 PROCEDURE — 250N000011 HC RX IP 250 OP 636: Performed by: PHYSICIAN ASSISTANT

## 2024-07-27 PROCEDURE — 250N000012 HC RX MED GY IP 250 OP 636 PS 637: Performed by: PHYSICIAN ASSISTANT

## 2024-07-27 RX ORDER — ACETAMINOPHEN 500 MG
500 TABLET ORAL EVERY 6 HOURS
Status: DISCONTINUED | OUTPATIENT
Start: 2024-07-27 | End: 2024-07-28

## 2024-07-27 RX ORDER — AMLODIPINE BESYLATE 2.5 MG/1
2.5 TABLET ORAL
Status: DISCONTINUED | OUTPATIENT
Start: 2024-07-27 | End: 2024-07-30 | Stop reason: HOSPADM

## 2024-07-27 RX ORDER — OXYMETAZOLINE HYDROCHLORIDE 0.05 G/100ML
2 SPRAY NASAL DAILY PRN
Status: ACTIVE | OUTPATIENT
Start: 2024-07-27 | End: 2024-07-29

## 2024-07-27 RX ORDER — IOPAMIDOL 755 MG/ML
56 INJECTION, SOLUTION INTRAVASCULAR ONCE
Status: COMPLETED | OUTPATIENT
Start: 2024-07-27 | End: 2024-07-27

## 2024-07-27 RX ORDER — OXYMETAZOLINE HYDROCHLORIDE 0.05 G/100ML
2 SPRAY NASAL DAILY PRN
Status: DISCONTINUED | OUTPATIENT
Start: 2024-07-27 | End: 2024-07-27

## 2024-07-27 RX ORDER — POTASSIUM CHLORIDE 1500 MG/1
40 TABLET, EXTENDED RELEASE ORAL ONCE
Status: COMPLETED | OUTPATIENT
Start: 2024-07-27 | End: 2024-07-27

## 2024-07-27 RX ADMIN — BRIMONIDINE TARTRATE 2 DROP: 2 SOLUTION/ DROPS OPHTHALMIC at 20:17

## 2024-07-27 RX ADMIN — ACETAMINOPHEN 500 MG: 500 TABLET, FILM COATED ORAL at 17:07

## 2024-07-27 RX ADMIN — IOPAMIDOL 56 ML: 755 INJECTION, SOLUTION INTRAVENOUS at 04:27

## 2024-07-27 RX ADMIN — BRIMONIDINE TARTRATE 2 DROP: 2 SOLUTION/ DROPS OPHTHALMIC at 08:51

## 2024-07-27 RX ADMIN — DIPHENHYDRAMINE HYDROCHLORIDE 25 MG: 25 CAPSULE ORAL at 02:09

## 2024-07-27 RX ADMIN — METHYLPREDNISOLONE 32 MG: 16 TABLET ORAL at 02:09

## 2024-07-27 RX ADMIN — ACETAMINOPHEN 650 MG: 325 TABLET, FILM COATED ORAL at 05:15

## 2024-07-27 RX ADMIN — POTASSIUM CHLORIDE 40 MEQ: 1500 TABLET, EXTENDED RELEASE ORAL at 11:46

## 2024-07-27 RX ADMIN — ACETAMINOPHEN 500 MG: 500 TABLET, FILM COATED ORAL at 23:58

## 2024-07-27 RX ADMIN — HEPARIN SODIUM 500 UNITS/HR: 10000 INJECTION, SOLUTION INTRAVENOUS at 17:12

## 2024-07-27 RX ADMIN — ACETAMINOPHEN 500 MG: 500 TABLET, FILM COATED ORAL at 11:46

## 2024-07-27 RX ADMIN — SODIUM CHLORIDE 83 ML: 9 INJECTION, SOLUTION INTRAVENOUS at 04:27

## 2024-07-27 RX ADMIN — AMLODIPINE BESYLATE 2.5 MG: 2.5 TABLET ORAL at 17:07

## 2024-07-27 RX ADMIN — LATANOPROST 1 DROP: 50 SOLUTION OPHTHALMIC at 20:18

## 2024-07-27 RX ADMIN — CARBOXYMETHYLCELLULOSE SODIUM 1 DROP: 5 SOLUTION/ DROPS OPHTHALMIC at 08:51

## 2024-07-27 RX ADMIN — CARBOXYMETHYLCELLULOSE SODIUM 1 DROP: 5 SOLUTION/ DROPS OPHTHALMIC at 20:17

## 2024-07-27 ASSESSMENT — ACTIVITIES OF DAILY LIVING (ADL)
ADLS_ACUITY_SCORE: 49
ADLS_ACUITY_SCORE: 45
ADLS_ACUITY_SCORE: 49
ADLS_ACUITY_SCORE: 45
ADLS_ACUITY_SCORE: 49
ADLS_ACUITY_SCORE: 45
ADLS_ACUITY_SCORE: 45
ADLS_ACUITY_SCORE: 49

## 2024-07-27 NOTE — PLAN OF CARE
A&OX4.  Up with assist of 1 and walker to pivot to bedside commode.  NWB to LLE, with hinged brace on at all times.  Voiding using BSC, incontinence at times.  Taking scheduled tylenol for pain.  Heprin gtt infusing at 500 units/hr and next hep 10A recheck at 2250 today.  K+ replaced today and recheck scheduled for 1800, writer will call the lab to ask when they plan to draw.  Discharge is pending TCU placement on Monday.

## 2024-07-27 NOTE — PROGRESS NOTES
"[facility]  Hospitalist Progress Note          Assessment and Plan:       Left tibial fracture    * No resolved hospital problems. *    Today's plan:  - Labs including CBC and chemistry reviewed.  - Images including CT chest, and venous Doppler lower extremity reviewed.  - Medications reviewed.  Will continue with heparin drip today and consider transitioning to oral anticoagulants tomorrow.  Resumed amlodipine.  - Plan discussed with orthopedic will follow-up with the recommendation.  Discharge plan is awaiting PT and OT clearance for home discharge versus placement.                   Interval History:     The patient was seen today for follow-up she denied any fever chills denied any chest pain palpitation shortness of breath expectoration phlegm or hemoptysis denied any headache or dizziness denied any numbness or tingling denied any rash or itching.  She is complaining of intermittent nosebleed.              Medications:   I have reviewed this patient's current medications               Physical Exam:   Blood pressure (!) 164/87, pulse 93, temperature 97.9  F (36.6  C), temperature source Oral, resp. rate 16, height 1.499 m (4' 11\"), weight 57 kg (125 lb 10.6 oz), SpO2 95%.      Vital Sign Ranges  Temperature Temp  Av.4  F (36.9  C)  Min: 97.9  F (36.6  C)  Max: 98.9  F (37.2  C)   Blood pressure Systolic (24hrs), Av , Min:143 , Max:164        Diastolic (24hrs), Av, Min:84, Max:94      Pulse Pulse  Av.8  Min: 92  Max: 108   Respirations Resp  Av.6  Min: 15  Max: 18   Pulse oximetry SpO2  Av.8 %  Min: 93 %  Max: 95 %         Intake/Output Summary (Last 24 hours) at 2024 1113  Last data filed at 2024 0900  Gross per 24 hour   Intake 440 ml   Output --   Net 440 ml   HEENT and neck: Pupils equal and regular.  Heart heart regular rate and rhythm  Lungs: Clear  Abdomen: Positive bowel sounds no tenderness  Extremities: No edema  Skin: No visible " rash               Data:   All laboratory data reviewed    78-year-old patient with history of osteoporosis, hypertension, hyperlipidemia, coronary artery disease admitted for proximal tibia fracture on 7/23/2024 secondary to mechanical fall.    Acute proximal left tibial metaphyseal fracture secondary to mechanical fall  The patient was admitted with leg pain following mechanical fall.  Images showed acute proximal tibial metaphyseal fracture  Discussed with orthopedic and orthopedic recommended nonoperative management just nonweightbearing left lower extremity with hinged knee brace.  Plan:  - Continue pain control PT and OT management and follow-up per orthopedic recommendation.    Acute DVT  Venous Doppler on 7/26/2024 was positive for DVT of right peroneal vein.  Plan:  - Currently on heparin drip transition to oral anticoagulant on 7/20/2024 if hemoglobin remained stable.    Acute hypoxic respiratory failure on admission resolved hypokalemia  CT chest on 7/20/1974 was negative for PE.  There was significant atelectasis.  Plan:  - Incentive spirometry and neb treatments for pulmonary lavage and wean off oxygen as able    History of coronary artery disease  Hypertension  -PTA amlodipine resumed with holding parameters for hypotension.  - Consider starting statin.    History of glaucoma  -PTA primidone drops resumed    CODE STATUS: Full code  Diet: Advance as tolerated  DVT prophylaxis: SCD  Dispo physician:  PT and OT clearance.  PT and OT clearance.

## 2024-07-27 NOTE — PLAN OF CARE
Goal Outcome Evaluation:      Pt A&OX4, VSS on RA. ASX1 with GB and Walker pivot to BSC. Heparin infusing 500 units /hr. LLE brace on. Egular diet Incontinent at time. PRN Tylenol for pain. Given report to RN and Transfer to Ortho.

## 2024-07-27 NOTE — PLAN OF CARE
Goal Outcome Evaluation:         Shift [id number]  PRIMARY Concern: Left tibial fx, nausea  SAFETY RISK Concerns (fall risk, behaviors, etc.): Falls, behavior: green      Isolation/Type: none  Tests/Procedures for NEXT shift: UC pending. RBC Morphology levels. Chest CT.   Consults? (Pending/following, signed-off?) SW/PT following (recs TCU)  Where is patient from? (Home, TCU, etc.): home, lives w/   Other Important info for NEXT shift: Reluctant to take pain meds but takes Tylenol and uses ice packs. Plan for chest CT at 0415 am. Medrol and PRN Ativan to be given prior to CT. Bilateral US showed a DVT in the right LE.  Anticipated DC date & active delays: TBD pending improvement  ___________________________________________  SUMMARY NOTE:  Orientation/Cognitive: A&O x 4  Observation Goals (Met/ Not Met): Inpatient  Mobility Level/Assist Equipment: A1-2 GB/W; Brace on LLE, NWB to LLE  Antibiotics & Plan (IV/po, length of tx left): none.  Pain Management:  Acetaminophen    Tele/VS/O2: VSS on RA except slightly elevated/tele is ST, asymptomatic.   ABNL Lab/BG: See chart  Diet: Regular.  No emesis today.   Bowel/Bladder: Incontinent at times.   Skin Concerns: Ecchymosis right elbow and left knee.   Drains/Devices: LLE brace, Heparin @ 1000 u/hr  Patient Stated Goal for Today: Rest

## 2024-07-27 NOTE — PROGRESS NOTES
Care Management Follow Up    Length of Stay (days): 1    Expected Discharge Date: 07/29/2024     Concerns to be Addressed: discharge planning     Patient plan of care discussed at interdisciplinary rounds: Yes    Anticipated Discharge Disposition: Home Care, Skilled Nursing Facility     Anticipated Discharge Services: Home Care  Anticipated Discharge DME:      Patient/family educated on Medicare website which has current facility and service quality ratings: other (see comments)  Education Provided on the Discharge Plan: Yes  Patient/Family in Agreement with the Plan: yes    Referrals Placed by CM/SW:    Private pay costs discussed: Not applicable    Additional Information:  Patient called short stay SW to inform they were transferred to Ortho floor. SW confirmed they are aware of this, updated pt they will have a different SW following now. Informed pt we have not heard back from [facility] yet so as of now, the plan would still be [facility] on Monday. Pt confirmed they will need a medical transport at discharge.     JOLANTA Anaya  Social Work  [facility]

## 2024-07-27 NOTE — PLAN OF CARE
"Goal Outcome Evaluation:      Plan of Care Reviewed With: patient    Overall Patient Progress: improvingOverall Patient Progress: improving    PRIMARY Concern: L tibia fracture from a fall  SAFETY RISK Concerns (fall risk, behaviors, etc.): Fall risk       Isolation/Type: NA  Tests/Procedures for NEXT shift: AM labs  Consults? (Pending/following, signed-off?) SW following for TCU placement  Where is patient from? (Home, TCU, etc.): From home  Other Important info for NEXT shift: Pts potassium was not replaced d/t pt refusing and attempted to fix w/ diet, will be rechecked this AM. Hep Xa recheck at 711AM  Anticipated DC date & active delays: TBD pending TCU placement     SUMMARY NOTE:  Orientation/Cognitive: A&Ox4  Observation Goals (Met/ Not Met): INP  Mobility Level/Assist Equipment: Ax1,GB,walker pivot to BSC  Antibiotics & Plan (IV/po, length of tx left): NA  Pain Management: PRN tylenol was given x2  Tele/VS/O2: Tele:ST/SR VSS on RA  ABNL Lab/BG: K\"3.3 Hep>1.10  Diet: Regular  Bowel/Bladder: Can be incontinent at times   Skin Concerns: Scattered bruising  skin tear/scab to elbow  Drains/Devices: PIV infusing heparin @700units/hr  Patient Stated Goal for Today: To  rest      "

## 2024-07-28 ENCOUNTER — APPOINTMENT (OUTPATIENT)
Dept: PHYSICAL THERAPY | Facility: CLINIC | Age: 79
DRG: 562 | End: 2024-07-28
Payer: MEDICARE

## 2024-07-28 LAB
BASOPHILS # BLD AUTO: 0 10E3/UL (ref 0–0.2)
BASOPHILS NFR BLD AUTO: 0 %
EOSINOPHIL # BLD AUTO: 0 10E3/UL (ref 0–0.7)
EOSINOPHIL NFR BLD AUTO: 0 %
ERYTHROCYTE [DISTWIDTH] IN BLOOD BY AUTOMATED COUNT: 13.8 % (ref 10–15)
HCT VFR BLD AUTO: 44.5 % (ref 35–47)
HGB BLD-MCNC: 15.1 G/DL (ref 11.7–15.7)
IMM GRANULOCYTES # BLD: 0.1 10E3/UL
IMM GRANULOCYTES NFR BLD: 1 %
LYMPHOCYTES # BLD AUTO: 2.4 10E3/UL (ref 0.8–5.3)
LYMPHOCYTES NFR BLD AUTO: 23 %
MCH RBC QN AUTO: 32.4 PG (ref 26.5–33)
MCHC RBC AUTO-ENTMCNC: 33.9 G/DL (ref 31.5–36.5)
MCV RBC AUTO: 96 FL (ref 78–100)
MONOCYTES # BLD AUTO: 0.9 10E3/UL (ref 0–1.3)
MONOCYTES NFR BLD AUTO: 8 %
NEUTROPHILS # BLD AUTO: 7.2 10E3/UL (ref 1.6–8.3)
NEUTROPHILS NFR BLD AUTO: 68 %
NRBC # BLD AUTO: 0 10E3/UL
NRBC BLD AUTO-RTO: 0 /100
PLATELET # BLD AUTO: 172 10E3/UL (ref 150–450)
POTASSIUM SERPL-SCNC: 3.5 MMOL/L (ref 3.4–5.3)
RBC # BLD AUTO: 4.66 10E6/UL (ref 3.8–5.2)
UFH PPP CHRO-ACNC: 0.23 IU/ML
UFH PPP CHRO-ACNC: 0.53 IU/ML
WBC # BLD AUTO: 10.7 10E3/UL (ref 4–11)

## 2024-07-28 PROCEDURE — 84132 ASSAY OF SERUM POTASSIUM: CPT | Performed by: INTERNAL MEDICINE

## 2024-07-28 PROCEDURE — 250N000013 HC RX MED GY IP 250 OP 250 PS 637: Performed by: INTERNAL MEDICINE

## 2024-07-28 PROCEDURE — 250N000013 HC RX MED GY IP 250 OP 250 PS 637: Performed by: STUDENT IN AN ORGANIZED HEALTH CARE EDUCATION/TRAINING PROGRAM

## 2024-07-28 PROCEDURE — 120N000001 HC R&B MED SURG/OB

## 2024-07-28 PROCEDURE — 36415 COLL VENOUS BLD VENIPUNCTURE: CPT | Performed by: STUDENT IN AN ORGANIZED HEALTH CARE EDUCATION/TRAINING PROGRAM

## 2024-07-28 PROCEDURE — 85520 HEPARIN ASSAY: CPT | Performed by: STUDENT IN AN ORGANIZED HEALTH CARE EDUCATION/TRAINING PROGRAM

## 2024-07-28 PROCEDURE — 99232 SBSQ HOSP IP/OBS MODERATE 35: CPT | Performed by: INTERNAL MEDICINE

## 2024-07-28 PROCEDURE — 97530 THERAPEUTIC ACTIVITIES: CPT | Mod: GP

## 2024-07-28 RX ORDER — ACETAMINOPHEN 500 MG
1000 TABLET ORAL EVERY 8 HOURS
Status: DISCONTINUED | OUTPATIENT
Start: 2024-07-28 | End: 2024-07-29

## 2024-07-28 RX ADMIN — ACETAMINOPHEN 1000 MG: 500 TABLET, FILM COATED ORAL at 14:08

## 2024-07-28 RX ADMIN — LATANOPROST 1 DROP: 50 SOLUTION OPHTHALMIC at 20:38

## 2024-07-28 RX ADMIN — AMLODIPINE BESYLATE 2.5 MG: 2.5 TABLET ORAL at 17:24

## 2024-07-28 RX ADMIN — APIXABAN 10 MG: 5 TABLET, FILM COATED ORAL at 20:38

## 2024-07-28 RX ADMIN — CARBOXYMETHYLCELLULOSE SODIUM 1 DROP: 5 SOLUTION/ DROPS OPHTHALMIC at 20:37

## 2024-07-28 RX ADMIN — ACETAMINOPHEN 500 MG: 500 TABLET, FILM COATED ORAL at 05:47

## 2024-07-28 RX ADMIN — AMLODIPINE BESYLATE 2.5 MG: 2.5 TABLET ORAL at 09:16

## 2024-07-28 RX ADMIN — BRIMONIDINE TARTRATE 2 DROP: 2 SOLUTION/ DROPS OPHTHALMIC at 20:38

## 2024-07-28 RX ADMIN — APIXABAN 10 MG: 5 TABLET, FILM COATED ORAL at 10:46

## 2024-07-28 RX ADMIN — ACETAMINOPHEN 1000 MG: 500 TABLET, FILM COATED ORAL at 22:06

## 2024-07-28 RX ADMIN — BRIMONIDINE TARTRATE 2 DROP: 2 SOLUTION/ DROPS OPHTHALMIC at 09:16

## 2024-07-28 RX ADMIN — CARBOXYMETHYLCELLULOSE SODIUM 1 DROP: 5 SOLUTION/ DROPS OPHTHALMIC at 09:16

## 2024-07-28 ASSESSMENT — ACTIVITIES OF DAILY LIVING (ADL)
ADLS_ACUITY_SCORE: 49
ADLS_ACUITY_SCORE: 45
ADLS_ACUITY_SCORE: 49
ADLS_ACUITY_SCORE: 45
ADLS_ACUITY_SCORE: 49
ADLS_ACUITY_SCORE: 45
ADLS_ACUITY_SCORE: 49

## 2024-07-28 NOTE — PROVIDER NOTIFICATION
Patient saying she is having more pain today and is requesting her tylenol be increased to 1000mg every 8 hours.

## 2024-07-28 NOTE — PROGRESS NOTES
[facility]  Hospitalist Progress Note          Assessment and Plan:       Left tibial fracture    * No resolved hospital problems. *      Today's plan:  - Labs including CBC and chemistry reviewed.  - Images including CT chest, and venous Doppler lower extremity reviewed.  - Medications reviewed.  Transition from heparin drip to oral Eliquis.  - Plan discussed with orthopedic will follow-up with the recommendation.  Discharge plan is awaiting PT and OT clearance for home discharge versus placeme               Interval History:     The patient was seen today for follow-up.  She is complaining of having more pain in her leg otherwise denied any fever chills denied any chest pain palpitation shortness of breath cough expectoration phlegm hemoptysis double vision blurred vision headache dizziness numbness tingling rash itching.               Medications:     Medications Prior to Admission   Medication Sig Dispense Refill Last Dose    acetaminophen (TYLENOL) 500 MG tablet Take 1,000 mg by mouth every 6 hours as needed for mild pain    at PRN    amLODIPine (NORVASC) 5 MG tablet Take 5 mg by mouth daily   7/23/2024 at AM    brimonidine (ALPHAGAN) 0.2 % ophthalmic solution Place 2 drops into the right eye 2 times daily   7/23/2024 at AM    calcium carbonate-vitamin D (OSCAL) 500-5 MG-MCG tablet Take 2 tablets by mouth 2 times daily   7/23/2024 at AM    clobetasol propionate (TEMOVATE) 0.05 % external cream Apply topically twice a week   Past Week    cycloSPORINE (RESTASIS) 0.05 % ophthalmic emulsion Place 1 drop into both eyes 2 times daily   7/23/2024    IBANdronate (BONIVA) 150 MG tablet Take 150 mg by mouth every 30 days   Past Month    MAGNESIUM GLUCONATE PO Take 100 mg by mouth every evening   7/22/2024 at PM    multivitamin w/minerals (THERA-VIT-M) tablet Take 1 tablet by mouth daily   7/23/2024 at AM    SUMAtriptan (IMITREX) 25 MG tablet Take 25 mg by mouth at onset of headache for migraine   "  at PRN    tafluprost (ZIOPTAN) 0.0015 % SOLN ophthalmic solution Place 1 drop into both eyes at bedtime   2024 at HS    TURMERIC PO Take 500 mg by mouth 2 times daily   2024 at AM                  Physical Exam:   Blood pressure 131/72, pulse 82, temperature 97.6  F (36.4  C), temperature source Oral, resp. rate 16, height 1.499 m (4' 11\"), weight 57 kg (125 lb 10.6 oz), SpO2 95%.      Vital Sign Ranges  Temperature Temp  Av  F (36.7  C)  Min: 97.6  F (36.4  C)  Max: 98.4  F (36.9  C)   Blood pressure Systolic (24hrs), Av , Min:131 , Max:168        Diastolic (24hrs), Av, Min:72, Max:102      Pulse Pulse  Av.6  Min: 82  Max: 115   Respirations Resp  Av  Min: 16  Max: 16   Pulse oximetry SpO2  Av.8 %  Min: 93 %  Max: 96 %       No intake or output data in the 24 hours ending 24 0940    HEENT and neck: Pupils equal and radial  Heart: Regular rate and rhythm  Lungs: Clear  Abdomen: Positive bowel sounds no tenderness  Extremities: No edema  Skin: No rash           78-year-old patient with history of osteoporosis, hypertension, hyperlipidemia, coronary artery disease admitted for proximal tibia fracture on 2024 secondary to mechanical fall.     Acute proximal left tibial metaphyseal fracture secondary to mechanical fall  The patient was admitted with leg pain following mechanical fall.  Images showed acute proximal tibial metaphyseal fracture  Discussed with orthopedic and orthopedic recommended nonoperative management just nonweightbearing left lower extremity with hinged knee brace.  Plan:  - Continue pain control PT and OT management and follow-up per orthopedic recommendation.     Acute DVT  Venous Doppler on 2024 was positive for DVT of right peroneal vein.  Plan:  - Position from heparin drip to oral Eliquis.     Acute hypoxic respiratory failure on admission resolved hypokalemia  CT chest on 1974 was negative for PE.  There was significant " atelectasis.  Plan:  - Incentive spirometry and neb treatments for pulmonary lavage and wean off oxygen as able     History of coronary artery disease  Hypertension  -PTA amlodipine resumed with holding parameters for hypotension.  - Consider starting statin.     History of glaucoma  -PTA primidone drops resumed     CODE STATUS: Full code  Diet: Advance as tolerated  DVT prophylaxis: SCD  Dispo physician:  PT and OT clearance.  PT and OT clearance.          Data:   All laboratory data reviewed

## 2024-07-28 NOTE — PLAN OF CARE
A&OX4.  Up with 1 and walker.  Up in recliner chair today and using bedside commode to void.  Tolerating regular diet.  Hinged brace on at all times, physical therapy stated they will reach out to orthotics tomorrow per patient request to come and assess the brace is fitting well.  Tele discontinued today.  Heprin gtt discontinued today and eliquis po started.  Discharge is pending TCU placement.

## 2024-07-28 NOTE — PLAN OF CARE
Goal Outcome Evaluation:      Plan of Care Reviewed With: patient    Diagnosis: Left Tibia Fx  POD#: NA  Mental Status: A&Ox4  Activity/dangle Assist of 1 pivot to BSC  Diet: reg  Pain: scheduled tylenol   Maxwell/Voiding: BSC  Tele/Restraints/Iso: tele NSR  02/LDA: RA, Heparin infusing @650  D/C Date: Tcu Monday   Other Info: K+ protocol, hinge brace on, NWB LLE

## 2024-07-29 LAB
ERYTHROCYTE [DISTWIDTH] IN BLOOD BY AUTOMATED COUNT: 14 % (ref 10–15)
HCT VFR BLD AUTO: 42.3 % (ref 35–47)
HGB BLD-MCNC: 13.6 G/DL (ref 11.7–15.7)
MCH RBC QN AUTO: 30.9 PG (ref 26.5–33)
MCHC RBC AUTO-ENTMCNC: 32.2 G/DL (ref 31.5–36.5)
MCV RBC AUTO: 96 FL (ref 78–100)
PLATELET # BLD AUTO: 166 10E3/UL (ref 150–450)
POTASSIUM SERPL-SCNC: 3.6 MMOL/L (ref 3.4–5.3)
RBC # BLD AUTO: 4.4 10E6/UL (ref 3.8–5.2)
WBC # BLD AUTO: 6.4 10E3/UL (ref 4–11)

## 2024-07-29 PROCEDURE — 250N000013 HC RX MED GY IP 250 OP 250 PS 637: Performed by: INTERNAL MEDICINE

## 2024-07-29 PROCEDURE — 82180 ASSAY OF ASCORBIC ACID: CPT | Performed by: INTERNAL MEDICINE

## 2024-07-29 PROCEDURE — 99232 SBSQ HOSP IP/OBS MODERATE 35: CPT | Performed by: INTERNAL MEDICINE

## 2024-07-29 PROCEDURE — 36415 COLL VENOUS BLD VENIPUNCTURE: CPT | Performed by: INTERNAL MEDICINE

## 2024-07-29 PROCEDURE — 85014 HEMATOCRIT: CPT | Performed by: INTERNAL MEDICINE

## 2024-07-29 PROCEDURE — 96361 HYDRATE IV INFUSION ADD-ON: CPT

## 2024-07-29 PROCEDURE — 250N000013 HC RX MED GY IP 250 OP 250 PS 637: Performed by: STUDENT IN AN ORGANIZED HEALTH CARE EDUCATION/TRAINING PROGRAM

## 2024-07-29 PROCEDURE — 84132 ASSAY OF SERUM POTASSIUM: CPT | Performed by: STUDENT IN AN ORGANIZED HEALTH CARE EDUCATION/TRAINING PROGRAM

## 2024-07-29 PROCEDURE — 120N000001 HC R&B MED SURG/OB

## 2024-07-29 RX ORDER — OXYCODONE HYDROCHLORIDE 5 MG/1
5 TABLET ORAL EVERY 4 HOURS PRN
Qty: 8 TABLET | Refills: 0 | Status: SHIPPED | OUTPATIENT
Start: 2024-07-29 | End: 2024-07-29

## 2024-07-29 RX ORDER — CARBOXYMETHYLCELLULOSE SODIUM 5 MG/ML
1 SOLUTION/ DROPS OPHTHALMIC 2 TIMES DAILY
DISCHARGE
Start: 2024-07-29

## 2024-07-29 RX ORDER — AMLODIPINE BESYLATE 2.5 MG/1
2.5 TABLET ORAL 2 TIMES DAILY
DISCHARGE
Start: 2024-07-29

## 2024-07-29 RX ORDER — ACETAMINOPHEN 500 MG
1000 TABLET ORAL EVERY 6 HOURS
DISCHARGE
Start: 2024-07-29

## 2024-07-29 RX ORDER — MULTIVIT WITH MINERALS/LUTEIN
1500 TABLET ORAL DAILY
Status: DISCONTINUED | OUTPATIENT
Start: 2024-07-29 | End: 2024-07-30 | Stop reason: HOSPADM

## 2024-07-29 RX ORDER — ACETAMINOPHEN 500 MG
1000 TABLET ORAL EVERY 6 HOURS
Status: DISCONTINUED | OUTPATIENT
Start: 2024-07-29 | End: 2024-07-30

## 2024-07-29 RX ADMIN — ACETAMINOPHEN 1000 MG: 500 TABLET, FILM COATED ORAL at 05:46

## 2024-07-29 RX ADMIN — APIXABAN 10 MG: 5 TABLET, FILM COATED ORAL at 20:53

## 2024-07-29 RX ADMIN — ACETAMINOPHEN 1000 MG: 500 TABLET, FILM COATED ORAL at 18:17

## 2024-07-29 RX ADMIN — CARBOXYMETHYLCELLULOSE SODIUM 1 DROP: 5 SOLUTION/ DROPS OPHTHALMIC at 08:13

## 2024-07-29 RX ADMIN — ACETAMINOPHEN 1000 MG: 500 TABLET, FILM COATED ORAL at 11:55

## 2024-07-29 RX ADMIN — BRIMONIDINE TARTRATE 2 DROP: 2 SOLUTION/ DROPS OPHTHALMIC at 08:19

## 2024-07-29 RX ADMIN — AMLODIPINE BESYLATE 2.5 MG: 2.5 TABLET ORAL at 08:13

## 2024-07-29 RX ADMIN — CARBOXYMETHYLCELLULOSE SODIUM 1 DROP: 5 SOLUTION/ DROPS OPHTHALMIC at 20:53

## 2024-07-29 RX ADMIN — BRIMONIDINE TARTRATE 2 DROP: 2 SOLUTION/ DROPS OPHTHALMIC at 20:19

## 2024-07-29 RX ADMIN — APIXABAN 10 MG: 5 TABLET, FILM COATED ORAL at 08:13

## 2024-07-29 RX ADMIN — Medication 1500 MG: at 11:55

## 2024-07-29 RX ADMIN — AMLODIPINE BESYLATE 2.5 MG: 2.5 TABLET ORAL at 18:17

## 2024-07-29 RX ADMIN — LATANOPROST 1 DROP: 50 SOLUTION OPHTHALMIC at 22:29

## 2024-07-29 RX ADMIN — ACETAMINOPHEN 1000 MG: 500 TABLET, FILM COATED ORAL at 23:49

## 2024-07-29 ASSESSMENT — ACTIVITIES OF DAILY LIVING (ADL)
ADLS_ACUITY_SCORE: 45
ADLS_ACUITY_SCORE: 49
ADLS_ACUITY_SCORE: 49
ADLS_ACUITY_SCORE: 45
ADLS_ACUITY_SCORE: 49
ADLS_ACUITY_SCORE: 45

## 2024-07-29 NOTE — PLAN OF CARE
Goal Outcome Evaluation:      Plan of Care Reviewed With: patient    Diagnosis: Left tibia fx  POD#: NA  Mental Status: A&Ox4  Activity/dangle Assist 1 pivot   Diet: reg  Pain: scheduled tylenol   Maxwell/Voiding: BSC  Tele/Restraints/Iso: NA  02/LDA: KATIE MIRANDA  D/C Date: TCU today   Other Info: K+ protocol, hinge brace on at all times, orthotics to come look at brace today per pt request, NWB LLE

## 2024-07-29 NOTE — PROGRESS NOTES
[facility]    Medicine Progress Note - Hospitalist Service    Date of Admission:  7/23/2024    Assessment & Plan   78-year-old patient with history of osteoporosis, hypertension, hyperlipidemia, coronary artery disease admitted for proximal tibia fracture on 7/23/2024 secondary to mechanical fall.     Acute proximal left tibial metaphyseal fracture secondary to mechanical fall  The patient was admitted with leg pain following mechanical fall.  Images showed acute proximal tibial metaphyseal fracture    Orthopedics recommended nonoperative management just nonweightbearing left lower extremity with hinged knee brace.    - Continue pain control PT and OT     Will need close oupt f/up with ortho after discharge with repeat imaging in the next 2 wks.    Pain control - requesting an increase of tylenol to 1000mg po q6h    Unable to tolerate tramadol, po narcotics without adverse reactions and is requesting only tylenol     2. Acute DVT  Venous Doppler on 7/26/2024 was positive for DVT of right peroneal vein.    Transition to eliquis and tolerating well    Unclear trigger/risk factors for DVT    3. Acute hypoxic respiratory failure on admission resolved     CT chest on 7/20/1974 was negative for PE.  There was significant atelectasis.    Continue with IS at bedside   Currently not hypoxic or requiring supplemental oxygen    4. History of coronary artery disease  Hypertension    -PTA amlodipine 2.5mg po bid ordered with holding parameters    5. History of glaucoma  -PTA primidone drops resumed     6.  Chronic difficulty with abd bloating    Requesting that I restart her PTA vitamin C supplements (1500mg/day) as recommended from her functional medicine doctor    Will hold of on scheduled magnesium, for now    PPE Used:  Mask, gloves          Diet: Regular Diet Adult    DVT Prophylaxis: DOAC  Maxwell Catheter: Not present  Lines: None     Cardiac Monitoring: None  Code Status: Full Code      Clinically  "Significant Risk Factors        # Hypokalemia: Lowest K = 3.1 mmol/L in last 2 days, will replace as needed                      # Overweight: Estimated body mass index is 25.38 kg/m  as calculated from the following:    Height as of this encounter: 1.499 m (4' 11\").    Weight as of this encounter: 57 kg (125 lb 10.6 oz)., PRESENT ON ADMISSION     # Financial/Environmental Concerns: none         Disposition Plan     Medically Ready for Discharge: Anticipated Today             Talita Boyer DO  Hospitalist Service  [facility]  Securely message with [email or web address] (more info)  Text page via [facility] Paging/Directory   ______________________________________________________________________    Interval History     Doing ok.  Feeling anxious and overwhelmed at times.  Wondering if she can have more scheduled tylenol.      No HA, CP, SOB or F/C.  No N/V---slightly decreased appetitie    Physical Exam   Vital Signs: Temp: 97.9  F (36.6  C) Temp src: Oral BP: (!) 146/75 Pulse: 89   Resp: 16 SpO2: 94 % O2 Device: None (Room air)    Weight: 125 lbs 10.6 oz    GEN:  Alert, oriented x 3, appears comfortable, no overt respiratory distress.  HEENT:  Normocephalic/atraumatic, no scleral icterus, no nasal discharge, mouth moist.  CV:  Regular rate and rhythm, no loud murmur  S1 + S2 noted  LUNGS:  Clear to auscultation ant/lat bilaterally without rales/rhonchi/wheezing/retractions.  Symmetric chest rise on inhalation noted.  ABD:  Active bowel sounds,soft, non-tender/non significantly distended.  No rebound/guarding/rigidity.  EXT:  left leg in immobilizer  Slight bruising (yellowish) to the inferior/anterior knee joint  No significant pretibial edema bilaterally   PSYCH:  appears worried, mildly anxious  cooperative    Medical Decision Making       49 MINUTES SPENT BY ME on the date of service doing chart review, history, exam, documentation & further activities per the note.      Data   Medications "   Current Facility-Administered Medications   Medication Dose Route Frequency Provider Last Rate Last Admin     Current Facility-Administered Medications   Medication Dose Route Frequency Provider Last Rate Last Admin    acetaminophen (TYLENOL) tablet 1,000 mg  1,000 mg Oral Q8H Eduin Barton MD   1,000 mg at 07/29/24 0546    amLODIPine (NORVASC) tablet 2.5 mg  2.5 mg Oral BID IS Eduin Barton MD   2.5 mg at 07/28/24 1724    apixaban ANTICOAGULANT (ELIQUIS) tablet 10 mg  10 mg Oral BID Eduin Barton MD   10 mg at 07/28/24 2038    brimonidine (ALPHAGAN) 0.2 % ophthalmic solution 2 drop  2 drop Right Eye BID Quirino Peña MD   2 drop at 07/28/24 2038    carboxymethylcellulose PF (REFRESH PLUS) 0.5 % ophthalmic solution 1 drop  1 drop Both Eyes BID Ming Coleman MD   1 drop at 07/28/24 2037    diphenhydrAMINE (BENADRYL) capsule 50 mg  50 mg Oral Once Radha Romero PA-C        latanoprost (XALATAN) 0.005 % ophthalmic solution 1 drop  1 drop Both Eyes At Bedtime Ming Coleman MD   1 drop at 07/28/24 2038     Labs and Imaging results below reviewed today.  Recent Labs   Lab 07/29/24  0645 07/27/24  2336 07/26/24  1410   WBC 6.4 10.7 7.0   HGB 13.6 15.1 14.3   HCT 42.3 44.5 43.9   MCV 96 96 98    172 136*     Recent Labs   Lab 07/29/24  0645 07/28/24  0648 07/27/24  1944 07/27/24  0650 07/26/24  1410 07/26/24  0723 07/24/24  0838   NA  --   --   --  141  --  142 143   POTASSIUM 3.6 3.5 3.5 3.1*   < > 3.3* 3.8   CHLORIDE  --   --   --  107  --  109* 108*   CO2  --   --   --  23 -- 24 26   ANIONGAP  --   --   --  11  --  9 9   GLC  --   --   --  148*  --  95 130*   BUN  --   --   --  16.7  --  8.1 17.8   CR  --   --   --  0.52  --  0.58 0.78   GFRESTIMATED  --   --   --  >90  --  >90 78   BARRY  --   --   --  9.1  --  8.5* 9.2    < > = values in this interval not displayed.     7-Day Micro Results       Collected Updated Procedure Result Status      07/25/2024 8567  07/27/2024 0551 Urine Culture [[id number]]    Urine, Midstream    Final result Component Value   Culture >100,000 CFU/mL Mixture of urogenital lucian                     Recent Labs   Lab 07/29/24  0645 07/28/24  0648 07/27/24  1944 07/27/24  0650 07/26/24  1410 07/26/24  0723 07/24/24  0838   NA  --   --   --  141  --  142 143   POTASSIUM 3.6 3.5 3.5 3.1*   < > 3.3* 3.8   CHLORIDE  --   --   --  107  --  109* 108*   CO2  --   --   --  23  --  24 26   ANIONGAP  --   --   --  11  --  9 9   GLC  --   --   --  148*  --  95 130*   BUN  --   --   --  16.7  --  8.1 17.8   CR  --   --   --  0.52  --  0.58 0.78   GFRESTIMATED  --   --   --  >90  --  >90 78   BARRY  --   --   --  9.1  --  8.5* 9.2   PROTTOTAL  --   --   --   --   --  6.4  --    ALBUMIN  --   --   --   --   --  3.5  --    BILITOTAL  --   --   --   --   --  0.5  --    ALKPHOS  --   --   --   --   --  83  --    AST  --   --   --   --   --  38  --    ALT  --   --   --   --   --  25  --     < > = values in this interval not displayed.     Recent Labs   Lab 07/25/24 2150   COLOR Straw   APPEARANCE Slightly Cloudy*   URINEGLC Negative   URINEBILI Negative   URINEKETONE Trace*   SG 1.009   UBLD Negative   URINEPH 7.0   PROTEIN Negative   NITRITE Positive*   LEUKEST Negative   RBCU <1   WBCU <1       No results found for this or any previous visit (from the past 24 hour(s)).

## 2024-07-29 NOTE — PROGRESS NOTES
Care Management Follow Up    Length of Stay (days): 3    Expected Discharge Date: 07/29/2024     Concerns to be Addressed: discharge planning     Patient plan of care discussed at interdisciplinary rounds: Yes    Anticipated Discharge Disposition: Home Care, Skilled Nursing Facility     Anticipated Discharge Services: Home Care  Anticipated Discharge DME:      Patient/family educated on Medicare website which has current facility and service quality ratings: other (see comments)  Education Provided on the Discharge Plan: Yes  Patient/Family in Agreement with the Plan: yes    Referrals Placed by CM/SW:    Private pay costs discussed: Not applicable    Additional Information:  Spoke to patient to confirm transport mode to TCU. She needs a ride set up. Call to  [facility] transport and wheelchair rides are now scheduling past 930pm. Writer set up a ride for tomorrow, 7/30/24 between [id number] via wheelchair as that is too late for her to admit to [facility] . Updated [facility] and they are in agreement with this plan. Writer updated patient who also agrees with this plan.    1430: [facility] called TOSHIA Villarreal to confirm that they can accept patient tomorrow, 7/30/24 into a shared room. They also will need her to arrange her own transportation to any follow up appointments. Writer updated patient who is in agreement. Call to [facility] at [phone] and message left confirming this bed. Ride rescheduled for tomorrow between [phone] to [place].    SW to follow up with [facility] in AM regarding plan/bed.    JOLANTA Mcclelland

## 2024-07-29 NOTE — PLAN OF CARE
Goal Outcome Evaluation:  Date/Time 07/29/24  [id number]    Trauma/Ortho/Medical (Choose one) Trauma    Diagnosis: L Tib FX  Mental Status: A&Ox4  Activity/dangle up with h1 GBW  Diet: reg  Pain: Scheduled Tylenol  Maxwell/Voiding: BSC  Tele/Restraints/Iso:NA  02/LDA:JAZLYN MIRANDA  D/C Date: Tomorrow  Other Info: discharge plan to TCU

## 2024-07-30 ENCOUNTER — DOCUMENTATION ONLY (OUTPATIENT)
Dept: ORTHOPEDICS | Facility: CLINIC | Age: 79
End: 2024-07-30
Payer: MEDICARE

## 2024-07-30 VITALS
TEMPERATURE: 98.4 F | BODY MASS INDEX: 25.33 KG/M2 | RESPIRATION RATE: 16 BRPM | WEIGHT: 125.66 LBS | OXYGEN SATURATION: 95 % | DIASTOLIC BLOOD PRESSURE: 83 MMHG | HEART RATE: 95 BPM | HEIGHT: 59 IN | SYSTOLIC BLOOD PRESSURE: 157 MMHG

## 2024-07-30 LAB — POTASSIUM SERPL-SCNC: 3.8 MMOL/L (ref 3.4–5.3)

## 2024-07-30 PROCEDURE — 250N000013 HC RX MED GY IP 250 OP 250 PS 637: Performed by: INTERNAL MEDICINE

## 2024-07-30 PROCEDURE — 250N000013 HC RX MED GY IP 250 OP 250 PS 637: Performed by: STUDENT IN AN ORGANIZED HEALTH CARE EDUCATION/TRAINING PROGRAM

## 2024-07-30 PROCEDURE — 99239 HOSP IP/OBS DSCHRG MGMT >30: CPT | Performed by: INTERNAL MEDICINE

## 2024-07-30 PROCEDURE — 36415 COLL VENOUS BLD VENIPUNCTURE: CPT | Performed by: INTERNAL MEDICINE

## 2024-07-30 PROCEDURE — 84132 ASSAY OF SERUM POTASSIUM: CPT | Performed by: INTERNAL MEDICINE

## 2024-07-30 RX ORDER — UREA 10 %
500 LOTION (ML) TOPICAL DAILY
Status: DISCONTINUED | OUTPATIENT
Start: 2024-07-30 | End: 2024-07-30 | Stop reason: HOSPADM

## 2024-07-30 RX ORDER — DIMENHYDRINATE 50 MG
50 TABLET ORAL
Status: DISCONTINUED | OUTPATIENT
Start: 2024-07-30 | End: 2024-07-30

## 2024-07-30 RX ORDER — CLOBETASOL PROPIONATE 0.5 MG/G
CREAM TOPICAL 2 TIMES DAILY
Status: DISCONTINUED | OUTPATIENT
Start: 2024-07-30 | End: 2024-07-30 | Stop reason: HOSPADM

## 2024-07-30 RX ORDER — SUMATRIPTAN 25 MG/1
25 TABLET, FILM COATED ORAL
Status: DISCONTINUED | OUTPATIENT
Start: 2024-07-30 | End: 2024-07-30 | Stop reason: HOSPADM

## 2024-07-30 RX ORDER — DIMENHYDRINATE 50 MG
25 TABLET ORAL 2 TIMES DAILY
DISCHARGE
Start: 2024-07-30

## 2024-07-30 RX ORDER — ACETAMINOPHEN 500 MG
1000 TABLET ORAL EVERY 6 HOURS PRN
Status: DISCONTINUED | OUTPATIENT
Start: 2024-07-30 | End: 2024-07-30 | Stop reason: HOSPADM

## 2024-07-30 RX ORDER — DIMENHYDRINATE 50 MG
25 TABLET ORAL DAILY PRN
Status: DISCONTINUED | OUTPATIENT
Start: 2024-07-30 | End: 2024-07-30 | Stop reason: HOSPADM

## 2024-07-30 RX ORDER — CLOBETASOL PROPIONATE 0.5 MG/G
CREAM TOPICAL
DISCHARGE
Start: 2024-08-01 | End: 2024-07-30

## 2024-07-30 RX ORDER — DIMENHYDRINATE 50 MG
25 TABLET ORAL DAILY PRN
Status: DISCONTINUED | OUTPATIENT
Start: 2024-07-30 | End: 2024-07-30

## 2024-07-30 RX ORDER — CLOBETASOL PROPIONATE 0.5 MG/G
CREAM TOPICAL 2 TIMES DAILY
DISCHARGE
Start: 2024-07-30

## 2024-07-30 RX ADMIN — BRIMONIDINE TARTRATE 2 DROP: 2 SOLUTION/ DROPS OPHTHALMIC at 08:59

## 2024-07-30 RX ADMIN — Medication 1500 MG: at 08:53

## 2024-07-30 RX ADMIN — ACETAMINOPHEN 1000 MG: 500 TABLET, FILM COATED ORAL at 05:21

## 2024-07-30 RX ADMIN — CARBOXYMETHYLCELLULOSE SODIUM 1 DROP: 5 SOLUTION/ DROPS OPHTHALMIC at 08:53

## 2024-07-30 RX ADMIN — APIXABAN 10 MG: 5 TABLET, FILM COATED ORAL at 08:53

## 2024-07-30 RX ADMIN — AMLODIPINE BESYLATE 2.5 MG: 2.5 TABLET ORAL at 08:53

## 2024-07-30 ASSESSMENT — ACTIVITIES OF DAILY LIVING (ADL)
ADLS_ACUITY_SCORE: 49

## 2024-07-30 NOTE — PROGRESS NOTES
S: Lacie was seen at the [facility] room 2308 for and adjustment of her Breg T-Scope knee brace on her left leg.    O: The patient is being transferred to another facility (possibly a TCU) and would like more education on the knee orthosis.    A: I explained to the patient the purpose and function of the brace as well as donning and doffing. The patient was able to practice donning and doffing the brace. I then provided the  s instructions to the patient.    P: If there is a need to adjust the brace while the patient still in the hospital, the patient s nurse should contact the Orthotics and Prosthetics Office. Adjustments made outside of the hospital can be performed, at no charge, at any of the [facility].    Electronically signed by [place], Board Eligible

## 2024-07-30 NOTE — PROGRESS NOTES
Care Management Discharge Note    Discharge Date: 07/30/2024       Discharge Disposition: Home Care, Skilled Nursing Facility    Discharge Services: Home Care    Discharge DME:      Discharge Transportation: car, drives self, family or friend will provide    Private pay costs discussed: Not applicable    Does the patient's insurance plan have a 3 day qualifying hospital stay waiver?  Yes     Which insurance plan 3 day waiver is available? Alternative insurance waiver    Will the waiver be used for post-acute placement? Yes    PAS Confirmation Code: [id number]  Patient/family educated on Medicare website which has current facility and service quality ratings: other (see comments)    Education Provided on the Discharge Plan: Yes  Persons Notified of Discharge Plans: HUC, Charge Nurse, Bedside Nurse, MD, Facility, Patient and family  Patient/Family in Agreement with the Plan: yes    Handoff Referral Completed: Yes    Additional Information:  Patient will be discharging today to [facility]. Patient will be going via wheelchair ride to the TCU. PAS has been completed and faxed. Orders have been faxed to [phone]. Patient does not have any scripts that need to be sent. Patient will discharge to TCU. Transport will be here between 12 and 1.     Chase BOLIVAR, RIC  [facility]  Care Management

## 2024-07-30 NOTE — PROGRESS NOTES
Patient is AO X 4; VSS; on RA; very anxious; discharge is scheduled for this afternoon.   Leg brace is on.     Nurse to nurse report was given to Jessy.

## 2024-07-30 NOTE — PLAN OF CARE
Goal Outcome Evaluation:                  Date/Time 07/29 1900-0730     Trauma/Ortho/Medical (Choose one) :Trauma     Diagnosis: L Tib FX  Mental Status: A&Ox4  Activity/dangle:A-1 GBW  Diet: reg  Pain: Scheduled Tylenol  Maxwell/Voiding: BSC  Tele/Restraints/Iso:NA  02/LDA:RAJAZLYN  D/C Date: discharge plan to TCU 7/30  Other Info:  K protocol   Non-WB left LE with knee brace locked in extension

## 2024-07-30 NOTE — DISCHARGE SUMMARY
[facility]    Discharge Summary  Hospitalist    Date of Admission:  7/23/2024  Date of Discharge:  7/30/2024 12:34 PM  Provider:  Talita Boyer DO    Discharge Diagnoses    Acute traumatic proximal left tibial metaphyseal fracture   Mechanical fall PTA    Acute DVT, right peroneal vein  Acute hypoxic respiratory failure    Other medical issues:  Past Medical History:   Diagnosis Date    Ankle instability, left     Prior stress fracture    Anxiety     Cancer, uterine (H)     Status post DICK BSO remotely    Diverticulosis of large intestine     Elevated coronary artery calcium score 2019    Glaucoma     HTN (hypertension)     Hyperlipidemia     Lichen sclerosus     Migraine     Nephrolithiasis 2011    Osteochondral defect of ankle     Osteoporosis     Raynaud's syndrome     Reactive gastropathy 2021    On EGD    Squamous cell carcinoma, leg, right 2022    Tarsal coalition of left foot      H/o CAD  Lichen planus    History of Present Illness   Lacie Choi is a 78 year old female who presented with left leg pain after a mechanical fall.  Please see the admission history and physical for full details.    Hospital Course   Lacie Choi was admitted on 7/23/2024.  The following problems were addressed during her hospitalization:    Acute proximal left tibial metaphyseal fracture secondary to mechanical fall  The patient was admitted with leg pain following mechanical fall.  Images showed acute proximal tibial metaphyseal fracture.   Orthopedics consulted and recommended nonoperative management just nonweightbearing left lower extremity with hinged knee brace.    She will be discharging to TCU to continue to work with PT/OT.  She is utilizing po prn tylenol for pain control only; declining narcotics d/t pain difficulty with side effects/intolerance.     Plan to f/up with ortho in the clinic in the next 2 wks.     2. Acute DVT    Venous Doppler on 7/26/2024 was positive for  DVT of right peroneal vein.  She was initiated on IV heparin and transitioned to po eliquis at time of discharge without difficulty.       Unclear trigger/risk factors for DVT except ? Immobility.  Will need close f/up with PCP for further review and determination on length of anticoagulation.      3. Acute hypoxic respiratory failure on admission resolved      CT chest on 7/20/1974 was negative for PE.  There was significant atelectasis.  Respiratory status remains stable and is currently not hypoxic or requiring supplemental oxygen     4. History of coronary artery disease  Hypertension     -PTA amlodipine 2.5mg po bid ordered with holding parameters     5. History of glaucoma  -PTA primidone drops resumed     Significant Results and Procedures   none    Pending Results   Unresulted Labs Ordered in the Past 30 Days of this Admission       Date and Time Order Name Status Description    7/29/2024 10:56 AM Vitamin C In process             Code Status   Full Code       Primary Care Physician   Patricia Selby    Physical Exam   Temp: 98.4  F (36.9  C) Temp src: Oral BP: (!) 157/83 Pulse: 95   Resp: 16 SpO2: 95 % O2 Device: None (Room air)    Vitals:    07/23/24 1724 07/24/24 0205 07/26/24 1417   Weight: 52.2 kg (115 lb) 55.7 kg (122 lb 12.7 oz) 57 kg (125 lb 10.6 oz)     Vital Signs with Ranges  Temp:  [98  F (36.7  C)-98.4  F (36.9  C)] 98.4  F (36.9  C)  Pulse:  [] 95  Resp:  [16] 16  BP: (141-157)/(81-85) 157/83  SpO2:  [93 %-96 %] 95 %  No intake/output data recorded.    GEN:  Alert, oriented x 3, comfortable, NAD.    HEENT:  Normocephalic/atraumatic, PERRL, no scleral icterus, no nasal discharge  CV:  Regular rate and rhythm, no loud murmur to ausc.  S1 + S2 noted  LUNGS:  Clear to auscultation ant/lat bilaterally.  No rales/rhonchi/wheezing auscultated bilaterally.  No costal retractions bilaterally.  Symmetric chest rise on inhalation noted.  ABD:  Active bowel sounds, soft, non-tender/non-distended.     EXT:  left leg extended in a hinged brace.  Mild yellowish bruising to the pretibial area on the left knee.  No significant pretibial edema or cyanosis bilaterally.  PSYCH:  mildly anxious, not tearful      Discharge Disposition   Discharged to rehabilitation facility    Consultations This Hospital Stay   CARE MANAGEMENT / SOCIAL WORK IP CONSULT  ORTHOPEDIC SURGERY IP CONSULT  PHYSICAL THERAPY ADULT IP CONSULT  PHYSICAL THERAPY ADULT IP CONSULT  OCCUPATIONAL THERAPY ADULT IP CONSULT  PHARMACY IP CONSULT  PHARMACY LIAISON FOR MEDICATION COVERAGE CONSULT  VASCULAR ACCESS ADULT IP CONSULT  OCCUPATIONAL THERAPY ADULT IP CONSULT  PHYSICAL THERAPY ADULT IP CONSULT    Time Spent on this Encounter   ITalita DO, personally saw the patient today and spent greater than 30 minutes discharging this patient.    Discharge Orders      General info for SNF    Length of Stay Estimate: Short Term Care: Estimated # of Days <30  Condition at Discharge: Improving  Level of care:skilled   Rehabilitation Potential: Good  Admission H&P remains valid and up-to-date: Yes  Recent Chemotherapy: N/A  Use Nursing Home Standing Orders: Yes     Mantoux instructions    Give two-step Mantoux (PPD) Per Facility Policy Yes     Follow Up and recommended labs and tests    F/up with [facility] in 10 days for hospital f/up of left tibular fracture     Reason for your hospital stay    You were admitted with an acute fracture of your left leg     Weight bearing status    NWB left leg with hinged brace locked     Activity - Up with nursing assistance     Full Code     Occupational Therapy Adult Consult    Evaluate and treat as clinically indicated.    Reason:  left tibular fracture     Physical Therapy Adult Consult    Evaluate and treat as clinically indicated.    Reason:  left tibular fracture     Diet    Follow this diet upon discharge:       Regular Diet Adult     Discharge Medications   Current Discharge Medication List        START  taking these medications    Details   !! acetaminophen (TYLENOL) 500 MG tablet Take 2 tablets (1,000 mg) by mouth every 6 hours    Associated Diagnoses: Tibia/fibula fracture, left, closed, initial encounter      apixaban ANTICOAGULANT (ELIQUIS) 5 MG tablet Take 2 tablets (10 mg) by mouth 2 times daily for 6 days, THEN 1 tablet (5 mg) 2 times daily for 180 days.    Associated Diagnoses: Acute deep vein thrombosis (DVT) of other vein of lower extremity, unspecified laterality (H)      ascorbic acid 500 MG CHEW Take 1,500 mg by mouth daily    Associated Diagnoses: Tibia/fibula fracture, left, closed, initial encounter      carboxymethylcellulose PF (REFRESH PLUS) 0.5 % ophthalmic solution Place 1 drop into both eyes 2 times daily    Associated Diagnoses: Dry eyes      dimenhyDRINATE (DRAMAMINE) 50 MG tablet Take 0.5 tablets (25 mg) by mouth 2 times daily    Associated Diagnoses: Anxiety       !! - Potential duplicate medications found. Please discuss with provider.        CONTINUE these medications which have CHANGED    Details   amLODIPine (NORVASC) 2.5 MG tablet Take 1 tablet (2.5 mg) by mouth 2 times daily    Associated Diagnoses: Primary hypertension      clobetasol propionate (TEMOVATE) 0.05 % external cream Apply topically 2 times daily    Associated Diagnoses: Lichen planus           CONTINUE these medications which have NOT CHANGED    Details   !! acetaminophen (TYLENOL) 500 MG tablet Take 1,000 mg by mouth every 6 hours as needed for mild pain      brimonidine (ALPHAGAN) 0.2 % ophthalmic solution Place 2 drops into the right eye 2 times daily      calcium carbonate-vitamin D (OSCAL) 500-5 MG-MCG tablet Take 2 tablets by mouth 2 times daily      cycloSPORINE (RESTASIS) 0.05 % ophthalmic emulsion Place 1 drop into both eyes 2 times daily      IBANdronate (BONIVA) 150 MG tablet Take 150 mg by mouth every 30 days      MAGNESIUM GLUCONATE PO Take 100 mg by mouth every evening      multivitamin w/minerals  (THERA-VIT-M) tablet Take 1 tablet by mouth daily      SUMAtriptan (IMITREX) 25 MG tablet Take 25 mg by mouth at onset of headache for migraine      tafluprost (ZIOPTAN) 0.0015 % SOLN ophthalmic solution Place 1 drop into both eyes at bedtime      TURMERIC PO Take 500 mg by mouth 2 times daily       !! - Potential duplicate medications found. Please discuss with provider.        Allergies   Allergies   Allergen Reactions    Adhesive Tape Rash    Aspirin Other (See Comments)     hives    Cephalosporins Other (See Comments)     itchiness    Cyclobenzaprine Itching     Most muscle relaxers    Ibuprofen Other (See Comments)     itching    Iohexol Itching     PT WAS GIVEN OMNI 350 100 ML. HER TONGUE ITCHED AND AROUND HER LIPS.    Penicillins Other (See Comments)     Itching    Sulfa Antibiotics Other (See Comments)     itching    Covid-19 (Adenovirus) Vaccine Itching    Famotidine Itching    Gabapentin Nausea and Vomiting     irritability    Latex Itching     Data   Recent Labs   Lab 07/29/24  0645 07/27/24  2336 07/26/24  1410   WBC 6.4 10.7 7.0   HGB 13.6 15.1 14.3   HCT 42.3 44.5 43.9   MCV 96 96 98    172 136*     Recent Labs   Lab 07/30/24  0855 07/29/24  0645 07/28/24  0648 07/27/24  1944 07/27/24  0650 07/26/24  1410 07/26/24  0723 07/24/24  0838   NA  --   --   --   --  141  --  142 143   POTASSIUM 3.8 3.6 3.5   < > 3.1*   < > 3.3* 3.8   CHLORIDE  --   --   --   --  107  --  109* 108*   CO2  --   --   --   --  23 --  24 26   ANIONGAP  --   --   --   --  11  --  9 9   GLC  --   --   --   --  148*  --  95 130*   BUN  --   --   --   --  16.7  --  8.1 17.8   CR  --   --   --   --  0.52  --  0.58 0.78   GFRESTIMATED  --   --   --   --  >90  --  >90 78   BARRY  --   --   --   --  9.1  --  8.5* 9.2    < > = values in this interval not displayed.     Recent Labs   Lab 07/30/24  0855 07/29/24  0645 07/28/24  0648 07/27/24  1944 07/27/24  0650 07/26/24  1410 07/26/24  0723 07/24/24  0838   NA  --   --   --   --  141   "--  142 143   POTASSIUM 3.8 3.6 3.5   < > 3.1*   < > 3.3* 3.8   CHLORIDE  --   --   --   --  107  --  109* 108*   CO2  --   --   --   --  23 -- 24 26   ANIONGAP  --   --   --   --  11  --  9 9   GLC  --   --   --   --  148*  --  95 130*   BUN  --   --   --   --  16.7  --  8.1 17.8   CR  --   --   --   --  0.52  --  0.58 0.78   GFRESTIMATED  --   --   --   --  >90  --  >90 78   BARRY  --   --   --   --  9.1  --  8.5* 9.2   PROTTOTAL  --   --   --   --   --   --  6.4  --    ALBUMIN  --   --   --   --   --   --  3.5  --    BILITOTAL  --   --   --   --   --   --  0.5  --    ALKPHOS  --   --   --   --   --   --  83  --    AST  --   --   --   --   --   --  38  --    ALT  --   --   --   --   --   --  25  --     < > = values in this interval not displayed.     No results for input(s): \"LACT\" in the last 168 hours.  Recent Labs   Lab 07/25/24  2150   COLOR Straw   APPEARANCE Slightly Cloudy*   URINEGLC Negative   URINEBILI Negative   URINEKETONE Trace*   SG 1.009   UBLD Negative   URINEPH 7.0   PROTEIN Negative   NITRITE Positive*   LEUKEST Negative   RBCU <1   WBCU <1     Results for orders placed or performed during the hospital encounter of 07/23/24   XR Tibia and Fibula Left 2 Views    Narrative    EXAM: XR TIBIA AND FIBULA LEFT 2 VIEWS, XR PELVIS 1/2 VIEWS, XR FEMUR LEFT 2 VIEWS, XR KNEE LEFT 1/2 VIEWS  LOCATION: [facility]  DATE: 7/23/2024    INDICATION: Fall trauma  COMPARISON: None.      Impression    IMPRESSION:     There is diffuse, severe osseous demineralization which limits radiographic evaluation for nondisplaced fractures. Additionally, the views of the left hip and proximal femur are nonstandard and limited by patient rotation as there was difficulty   positioning the patient due to patient pain.    1.  There is cortical irregularity to the anterior medial tibial plateau which likely represents a nondisplaced, acute fracture. Knee CT could confirm.  2.  There is some sclerosis " involving the left femoral neck and a nondisplaced femoral neck fracture is difficult to exclude. Additionally, the pelvis is poorly evaluated due to demineralization and patient rotation. Recommend CT of the left hip/pelvis   for full evaluation.   XR Femur Left 2 Views    Narrative    EXAM: XR TIBIA AND FIBULA LEFT 2 VIEWS, XR PELVIS 1/2 VIEWS, XR FEMUR LEFT 2 VIEWS, XR KNEE LEFT 1/2 VIEWS  LOCATION: [facility]  DATE: 7/23/2024    INDICATION: Fall trauma  COMPARISON: None.      Impression    IMPRESSION:     There is diffuse, severe osseous demineralization which limits radiographic evaluation for nondisplaced fractures. Additionally, the views of the left hip and proximal femur are nonstandard and limited by patient rotation as there was difficulty   positioning the patient due to patient pain.    1.  There is cortical irregularity to the anterior medial tibial plateau which likely represents a nondisplaced, acute fracture. Knee CT could confirm.  2.  There is some sclerosis involving the left femoral neck and a nondisplaced femoral neck fracture is difficult to exclude. Additionally, the pelvis is poorly evaluated due to demineralization and patient rotation. Recommend CT of the left hip/pelvis   for full evaluation.   XR Pelvis 1/2 Views    Narrative    EXAM: XR TIBIA AND FIBULA LEFT 2 VIEWS, XR PELVIS 1/2 VIEWS, XR FEMUR LEFT 2 VIEWS, XR KNEE LEFT 1/2 VIEWS  LOCATION: [facility]  DATE: 7/23/2024    INDICATION: Fall trauma  COMPARISON: None.      Impression    IMPRESSION:     There is diffuse, severe osseous demineralization which limits radiographic evaluation for nondisplaced fractures. Additionally, the views of the left hip and proximal femur are nonstandard and limited by patient rotation as there was difficulty   positioning the patient due to patient pain.    1.  There is cortical irregularity to the anterior medial tibial plateau which likely represents  a nondisplaced, acute fracture. Knee CT could confirm.  2.  There is some sclerosis involving the left femoral neck and a nondisplaced femoral neck fracture is difficult to exclude. Additionally, the pelvis is poorly evaluated due to demineralization and patient rotation. Recommend CT of the left hip/pelvis   for full evaluation.   XR Knee Left 1/2 Views    Narrative    EXAM: XR TIBIA AND FIBULA LEFT 2 VIEWS, XR PELVIS 1/2 VIEWS, XR FEMUR LEFT 2 VIEWS, XR KNEE LEFT 1/2 VIEWS  LOCATION: [facility]  DATE: 7/23/2024    INDICATION: Fall trauma  COMPARISON: None.      Impression    IMPRESSION:     There is diffuse, severe osseous demineralization which limits radiographic evaluation for nondisplaced fractures. Additionally, the views of the left hip and proximal femur are nonstandard and limited by patient rotation as there was difficulty   positioning the patient due to patient pain.    1.  There is cortical irregularity to the anterior medial tibial plateau which likely represents a nondisplaced, acute fracture. Knee CT could confirm.  2.  There is some sclerosis involving the left femoral neck and a nondisplaced femoral neck fracture is difficult to exclude. Additionally, the pelvis is poorly evaluated due to demineralization and patient rotation. Recommend CT of the left hip/pelvis   for full evaluation.   CT Pelvis Bone wo Contrast    Narrative    EXAM: CT KNEE LEFT W/O CONTRAST, CT FEMUR THIGH LEFT W/O CONTRAST, CT PELVIS BONE WO CONTRAST  LOCATION: [facility]  DATE: 7/23/2024    INDICATION: Fall. Trauma. Rule out fracture.  COMPARISON: None.  TECHNIQUE: Noncontrast. Axial, sagittal and coronal thin-section reconstruction. Dose reduction techniques were used.     FINDINGS:     BONES:  -There is diffuse, severe osseous demineralization.     There is an acute-appearing, nondisplaced fracture of the proximal, medial tibial metaphysis. This demonstrates equivocal  intra-articular extension to the medial tibial plateau, although the lack of a significant joint effusion suggests against   intra-articular extension.    No acute left femur or pelvic fracture is identified. Mild bilateral hip degenerative arthrosis. There are also degenerative changes at the pubic symphysis.    SOFT TISSUES:  -Arterial calcifications are noted. No drainable soft tissue collection.      Impression    IMPRESSION:  1.  Acute-appearing, nondisplaced fracture of the proximal, medial tibial metaphysis. This demonstrates equivocal intra-articular extension to the medial tibial plateau, although the lack of a significant joint effusion suggests against intra-articular   extension.  2.  No acute left femur or pelvic fracture is identified. Given the degree of osseous demineralization, MRI would be more sensitive for nondisplaced fracture and should be considered if there is persistent clinical concern.  3.  Additional chronic-appearing findings, as described.   CT Femur Thigh Left w/o Contrast    Narrative    EXAM: CT KNEE LEFT W/O CONTRAST, CT FEMUR THIGH LEFT W/O CONTRAST, CT PELVIS BONE WO CONTRAST  LOCATION: [facility]  DATE: 7/23/2024    INDICATION: Fall. Trauma. Rule out fracture.  COMPARISON: None.  TECHNIQUE: Noncontrast. Axial, sagittal and coronal thin-section reconstruction. Dose reduction techniques were used.     FINDINGS:     BONES:  -There is diffuse, severe osseous demineralization.     There is an acute-appearing, nondisplaced fracture of the proximal, medial tibial metaphysis. This demonstrates equivocal intra-articular extension to the medial tibial plateau, although the lack of a significant joint effusion suggests against   intra-articular extension.    No acute left femur or pelvic fracture is identified. Mild bilateral hip degenerative arthrosis. There are also degenerative changes at the pubic symphysis.    SOFT TISSUES:  -Arterial calcifications are noted.  No drainable soft tissue collection.      Impression    IMPRESSION:  1.  Acute-appearing, nondisplaced fracture of the proximal, medial tibial metaphysis. This demonstrates equivocal intra-articular extension to the medial tibial plateau, although the lack of a significant joint effusion suggests against intra-articular   extension.  2.  No acute left femur or pelvic fracture is identified. Given the degree of osseous demineralization, MRI would be more sensitive for nondisplaced fracture and should be considered if there is persistent clinical concern.  3.  Additional chronic-appearing findings, as described.   CT Knee Left w/o Contrast    Narrative    EXAM: CT KNEE LEFT W/O CONTRAST, CT FEMUR THIGH LEFT W/O CONTRAST, CT PELVIS BONE WO CONTRAST  LOCATION: [facility]  DATE: 7/23/2024    INDICATION: Fall. Trauma. Rule out fracture.  COMPARISON: None.  TECHNIQUE: Noncontrast. Axial, sagittal and coronal thin-section reconstruction. Dose reduction techniques were used.     FINDINGS:     BONES:  -There is diffuse, severe osseous demineralization.     There is an acute-appearing, nondisplaced fracture of the proximal, medial tibial metaphysis. This demonstrates equivocal intra-articular extension to the medial tibial plateau, although the lack of a significant joint effusion suggests against   intra-articular extension.    No acute left femur or pelvic fracture is identified. Mild bilateral hip degenerative arthrosis. There are also degenerative changes at the pubic symphysis.    SOFT TISSUES:  -Arterial calcifications are noted. No drainable soft tissue collection.      Impression    IMPRESSION:  1.  Acute-appearing, nondisplaced fracture of the proximal, medial tibial metaphysis. This demonstrates equivocal intra-articular extension to the medial tibial plateau, although the lack of a significant joint effusion suggests against intra-articular   extension.  2.  No acute left femur or pelvic  fracture is identified. Given the degree of osseous demineralization, MRI would be more sensitive for nondisplaced fracture and should be considered if there is persistent clinical concern.  3.  Additional chronic-appearing findings, as described.   CT Head w/o Contrast    Narrative    EXAM: CT HEAD W/O CONTRAST  LOCATION: [facility]  DATE: 7/25/2024    INDICATION: Dizziness, nausea, possible head trauma.  COMPARISON: None.  TECHNIQUE: Routine CT Head without IV contrast. Multiplanar reformats. Dose reduction techniques were used.    FINDINGS:  The superior most aspect of the skull is not included in the study.  INTRACRANIAL CONTENTS: No intracranial hemorrhage, extraaxial collection, or mass effect.  No CT evidence of acute infarct. Mild presumed chronic small vessel ischemic changes. Mild generalized volume loss. No hydrocephalus.     VISUALIZED ORBITS/SINUSES/MASTOIDS: No intraorbital abnormality. No paranasal sinus mucosal disease. No middle ear or mastoid effusion.    BONES/SOFT TISSUES: No acute abnormality.      Impression    IMPRESSION:  1.  No acute intracranial abnormality.  2.  The study does not include the uppermost aspect of the vertex of the skull. Please correlate clinically and if warranted, the patient can be brought back for additional imaging.   XR Chest Port 1 View    Narrative    EXAM: XR CHEST PORT 1 VIEW  LOCATION: [facility]  DATE: 7/25/2024    INDICATION: acute hypoxia  COMPARISON: None.      Impression    IMPRESSION: No acute cardiopulmonary abnormalities identified. Partially calcified thoracic aortic arch. Scattered hypertrophic changes of the spine.       US Lower Extremity Venous Duplex Bilateral    Narrative    VENOUS ULTRASOUND BOTH LEGS  7/26/2024 1:16 PM     HISTORY: Left leg fracture. A mobile. Evaluate for DVT. Pain in the  lower extremities.    COMPARISON: None.    FINDINGS:  Examination of the deep veins with graded  compression and  color flow Doppler with spectral wave form analysis was performed.      Right lower extremity: There is occlusive DVT in the right peroneal  veins extending from the proximal to mid calf. Remaining deep veins of  the right lower extremity are patent.    Left lower extremity: Poor visualization due to left leg brace. Unable  to image the left popliteal vein and calf veins are only partially  seen. Visualized veins of the left lower extremity are patent.    There has been previously stripped of the left greater saphenous vein.      Impression    IMPRESSION: DVT in the right peroneal veins from the proximal to mid  calf.    SHARLA WOGN MD         SYSTEM ID:  [id number]   CT Chest Pulmonary Embolism w Contrast    Narrative    EXAM: CT CHEST PULMONARY EMBOLISM W CONTRAST  LOCATION: [facility]  DATE: 7/27/2024    INDICATION: assess for PE, +DVT with tachy; Female sex; Not pregnant; No prior imaging in the last 24 hours; Pulmonary Embolism Rule Out Criteria (PERC) score > 0; Revised Hart Score (RGS) not >= 11; No D dimer result available; D dimer not ordered  COMPARISON: None.  TECHNIQUE: CT chest pulmonary angiogram during arterial phase injection of IV contrast. Multiplanar reformats and MIP reconstructions were performed. Dose reduction techniques were used.   CONTRAST: 56 mL Isovue 370    FINDINGS:  ANGIOGRAM CHEST: Pulmonary arteries are normal caliber and negative for pulmonary emboli. Thoracic aorta is negative for dissection. No CT evidence of right heart strain. Extensive atheromatous calcification.    LUNGS AND PLEURA: Dependent atelectasis.    MEDIASTINUM/AXILLAE: Normal.    CORONARY ARTERY CALCIFICATION: Severe.    UPPER ABDOMEN: Normal.    MUSCULOSKELETAL: Degenerative change in the thoracic spine and at the right sternoclavicular joint.      Impression    IMPRESSION:  1.  No acute abnormality is present. There is extensive atheromatous vascular calcification,  including at the coronary arteries.

## 2024-07-30 NOTE — PLAN OF CARE
Physical Therapy Discharge Summary    Reason for therapy discharge:    Discharged to transitional care facility.    Progress towards therapy goal(s). See goals on Care Plan in [facility] electronic health record for goal details.  Goals partially met.  Barriers to achieving goals:   discharge from facility.    Therapy recommendation(s):    Continued therapy is recommended.  Rationale/Recommendations:  To improve IND with mobility as pt below baseline at this time.

## 2024-08-01 LAB — VIT C SERPL-MCNC: 20 UMOL/L
